# Patient Record
Sex: FEMALE | Race: WHITE | Employment: FULL TIME | URBAN - METROPOLITAN AREA
[De-identification: names, ages, dates, MRNs, and addresses within clinical notes are randomized per-mention and may not be internally consistent; named-entity substitution may affect disease eponyms.]

---

## 2017-09-15 ENCOUNTER — HOSPITAL ENCOUNTER (OUTPATIENT)
Dept: NUCLEAR MEDICINE | Age: 42
Discharge: HOME | End: 2017-09-15
Attending: FAMILY MEDICINE
Payer: COMMERCIAL

## 2017-09-15 DIAGNOSIS — E11.65 TYPE 2 DIABETES MELLITUS WITH HYPERGLYCEMIA (HCC): ICD-10-CM

## 2017-09-15 DIAGNOSIS — Z79.4 INSULIN LONG-TERM USE (HCC): ICD-10-CM

## 2017-09-15 DIAGNOSIS — R10.13 DYSPEPSIA: ICD-10-CM

## 2017-09-15 PROCEDURE — 78264 GASTRIC EMPTYING IMG STUDY: CPT

## 2018-05-07 ENCOUNTER — HOSPITAL ENCOUNTER (EMERGENCY)
Age: 43
Discharge: HOME | End: 2018-05-07
Attending: STUDENT IN AN ORGANIZED HEALTH CARE EDUCATION/TRAINING PROGRAM
Payer: COMMERCIAL

## 2018-05-07 ENCOUNTER — APPOINTMENT (OUTPATIENT)
Dept: GENERAL RADIOLOGY | Age: 43
End: 2018-05-07
Attending: PHYSICIAN ASSISTANT
Payer: COMMERCIAL

## 2018-05-07 VITALS
BODY MASS INDEX: 40.17 KG/M2 | TEMPERATURE: 98.3 F | OXYGEN SATURATION: 98 % | DIASTOLIC BLOOD PRESSURE: 81 MMHG | RESPIRATION RATE: 18 BRPM | HEART RATE: 96 BPM | WEIGHT: 219.6 LBS | SYSTOLIC BLOOD PRESSURE: 119 MMHG

## 2018-05-07 DIAGNOSIS — R10.13 DRUG-INDUCED DYSPEPSIA: Primary | ICD-10-CM

## 2018-05-07 DIAGNOSIS — T50.905A DRUG-INDUCED DYSPEPSIA: Primary | ICD-10-CM

## 2018-05-07 LAB
ALBUMIN SERPL-MCNC: 3.7 G/DL (ref 3.5–5)
ALBUMIN/GLOB SERPL: 0.9 {RATIO} (ref 1.1–2.2)
ALP SERPL-CCNC: 82 U/L (ref 45–117)
ALT SERPL-CCNC: 30 U/L (ref 12–78)
ANION GAP SERPL CALC-SCNC: 8 MMOL/L (ref 5–15)
APPEARANCE UR: CLEAR
AST SERPL-CCNC: 22 U/L (ref 15–37)
BACTERIA URNS QL MICRO: NEGATIVE /HPF
BASOPHILS # BLD: 0 K/UL (ref 0–0.1)
BASOPHILS NFR BLD: 0 % (ref 0–1)
BILIRUB SERPL-MCNC: 0.4 MG/DL (ref 0.2–1)
BILIRUB UR QL: NEGATIVE
BUN SERPL-MCNC: 16 MG/DL (ref 6–20)
BUN/CREAT SERPL: 23 (ref 12–20)
CALCIUM SERPL-MCNC: 9.6 MG/DL (ref 8.5–10.1)
CHLORIDE SERPL-SCNC: 107 MMOL/L (ref 97–108)
CO2 SERPL-SCNC: 26 MMOL/L (ref 21–32)
COLOR UR: NORMAL
CREAT SERPL-MCNC: 0.71 MG/DL (ref 0.55–1.02)
DIFFERENTIAL METHOD BLD: ABNORMAL
EOSINOPHIL # BLD: 0.1 K/UL (ref 0–0.4)
EOSINOPHIL NFR BLD: 1 % (ref 0–7)
EPITH CASTS URNS QL MICRO: NORMAL /LPF
ERYTHROCYTE [DISTWIDTH] IN BLOOD BY AUTOMATED COUNT: 13.6 % (ref 11.5–14.5)
GLOBULIN SER CALC-MCNC: 4.2 G/DL (ref 2–4)
GLUCOSE SERPL-MCNC: 98 MG/DL (ref 65–100)
GLUCOSE UR STRIP.AUTO-MCNC: NEGATIVE MG/DL
HCT VFR BLD AUTO: 45.1 % (ref 35–47)
HGB BLD-MCNC: 14.4 G/DL (ref 11.5–16)
HGB UR QL STRIP: NEGATIVE
HYALINE CASTS URNS QL MICRO: NORMAL /LPF (ref 0–5)
IMM GRANULOCYTES # BLD: 0 K/UL
IMM GRANULOCYTES NFR BLD AUTO: 0 %
KETONES UR QL STRIP.AUTO: NEGATIVE MG/DL
LEUKOCYTE ESTERASE UR QL STRIP.AUTO: NEGATIVE
LIPASE SERPL-CCNC: 90 U/L (ref 73–393)
LYMPHOCYTES # BLD: 4.7 K/UL (ref 0.8–3.5)
LYMPHOCYTES NFR BLD: 49 % (ref 12–49)
MCH RBC QN AUTO: 28.3 PG (ref 26–34)
MCHC RBC AUTO-ENTMCNC: 31.9 G/DL (ref 30–36.5)
MCV RBC AUTO: 88.8 FL (ref 80–99)
MONOCYTES # BLD: 0.3 K/UL (ref 0–1)
MONOCYTES NFR BLD: 3 % (ref 5–13)
NEUTS SEG # BLD: 4.5 K/UL (ref 1.8–8)
NEUTS SEG NFR BLD: 47 % (ref 32–75)
NITRITE UR QL STRIP.AUTO: NEGATIVE
NRBC # BLD: 0 K/UL (ref 0–0.01)
NRBC BLD-RTO: 0 PER 100 WBC
PH UR STRIP: 6 [PH] (ref 5–8)
PLATELET # BLD AUTO: 199 K/UL (ref 150–400)
PMV BLD AUTO: 11 FL (ref 8.9–12.9)
POTASSIUM SERPL-SCNC: 4 MMOL/L (ref 3.5–5.1)
PROT SERPL-MCNC: 7.9 G/DL (ref 6.4–8.2)
PROT UR STRIP-MCNC: NEGATIVE MG/DL
RBC # BLD AUTO: 5.08 M/UL (ref 3.8–5.2)
RBC #/AREA URNS HPF: NORMAL /HPF (ref 0–5)
RBC MORPH BLD: ABNORMAL
SODIUM SERPL-SCNC: 141 MMOL/L (ref 136–145)
SP GR UR REFRACTOMETRY: 1.01 (ref 1–1.03)
TROPONIN I SERPL-MCNC: <0.04 NG/ML
UR CULT HOLD, URHOLD: NORMAL
UROBILINOGEN UR QL STRIP.AUTO: 0.2 EU/DL (ref 0.2–1)
WBC # BLD AUTO: 9.6 K/UL (ref 3.6–11)
WBC MORPH BLD: ABNORMAL
WBC URNS QL MICRO: NORMAL /HPF (ref 0–4)

## 2018-05-07 PROCEDURE — 85025 COMPLETE CBC W/AUTO DIFF WBC: CPT | Performed by: PHYSICIAN ASSISTANT

## 2018-05-07 PROCEDURE — 83690 ASSAY OF LIPASE: CPT | Performed by: PHYSICIAN ASSISTANT

## 2018-05-07 PROCEDURE — 74011250637 HC RX REV CODE- 250/637: Performed by: STUDENT IN AN ORGANIZED HEALTH CARE EDUCATION/TRAINING PROGRAM

## 2018-05-07 PROCEDURE — 74011000250 HC RX REV CODE- 250: Performed by: STUDENT IN AN ORGANIZED HEALTH CARE EDUCATION/TRAINING PROGRAM

## 2018-05-07 PROCEDURE — 81001 URINALYSIS AUTO W/SCOPE: CPT | Performed by: PHYSICIAN ASSISTANT

## 2018-05-07 PROCEDURE — 99284 EMERGENCY DEPT VISIT MOD MDM: CPT

## 2018-05-07 PROCEDURE — 80053 COMPREHEN METABOLIC PANEL: CPT | Performed by: PHYSICIAN ASSISTANT

## 2018-05-07 PROCEDURE — 84484 ASSAY OF TROPONIN QUANT: CPT | Performed by: PHYSICIAN ASSISTANT

## 2018-05-07 PROCEDURE — 71046 X-RAY EXAM CHEST 2 VIEWS: CPT

## 2018-05-07 PROCEDURE — 96374 THER/PROPH/DIAG INJ IV PUSH: CPT

## 2018-05-07 PROCEDURE — 96375 TX/PRO/DX INJ NEW DRUG ADDON: CPT

## 2018-05-07 PROCEDURE — 93005 ELECTROCARDIOGRAM TRACING: CPT

## 2018-05-07 PROCEDURE — 74011250636 HC RX REV CODE- 250/636: Performed by: STUDENT IN AN ORGANIZED HEALTH CARE EDUCATION/TRAINING PROGRAM

## 2018-05-07 PROCEDURE — 36415 COLL VENOUS BLD VENIPUNCTURE: CPT | Performed by: PHYSICIAN ASSISTANT

## 2018-05-07 RX ORDER — ONDANSETRON 2 MG/ML
4 INJECTION INTRAMUSCULAR; INTRAVENOUS
Status: COMPLETED | OUTPATIENT
Start: 2018-05-07 | End: 2018-05-07

## 2018-05-07 RX ORDER — FAMOTIDINE 10 MG/ML
20 INJECTION INTRAVENOUS
Status: COMPLETED | OUTPATIENT
Start: 2018-05-07 | End: 2018-05-07

## 2018-05-07 RX ADMIN — LIDOCAINE HYDROCHLORIDE 40 ML: 20 SOLUTION ORAL; TOPICAL at 18:44

## 2018-05-07 RX ADMIN — ONDANSETRON 4 MG: 2 INJECTION INTRAMUSCULAR; INTRAVENOUS at 18:54

## 2018-05-07 RX ADMIN — FAMOTIDINE 20 MG: 10 INJECTION INTRAVENOUS at 18:49

## 2018-05-07 NOTE — ED TRIAGE NOTES
TRIAGE NOTE:  Patient arrives with c/o patient reports spasming sensation to center of chest that spasm up my throat. Patient c/o acid reflux, heart fluttering and tingling in fingers on Saturday, and tightness in chest that radiates to the back.

## 2018-05-07 NOTE — ED NOTES
Patient given discharge instructions, all questions answered and patient verbalized understanding.   Patient ambulatory to ED Lobby

## 2018-05-07 NOTE — DISCHARGE INSTRUCTIONS
Indigestion (Dyspepsia or Heartburn): Care Instructions  Your Care Instructions  Sometimes it can be hard to pinpoint the cause of indigestion. (It is also called dyspepsia or heartburn.) Most cases of an upset stomach with bloating, burning, burping, and nausea are minor and go away within several hours. Home treatment and over-the-counter medicine often are able to control symptoms. But if you take medicine to relieve your indigestion without making diet and lifestyle changes, your symptoms are likely to return again and again. If you get indigestion often, it may be a sign of a more serious medical problem. Be sure to follow up with your doctor, who may want to do tests to be sure of the cause of your indigestion. Follow-up care is a key part of your treatment and safety. Be sure to make and go to all appointments, and call your doctor if you are having problems. It's also a good idea to know your test results and keep a list of the medicines you take. How can you care for yourself at home? · Your doctor may recommend over-the-counter medicine. For mild or occasional indigestion, antacids such as Gaviscon, Mylanta, Maalox, or Tums, may help. Be safe with medicines. Be careful when you take over-the-counter antacid medicines. Many of these medicines have aspirin in them. Read the label to make sure that you are not taking more than the recommended dose. Too much aspirin can be harmful. · Your doctor also may recommend over-the-counter acid reducers, such as Pepcid AC, Tagamet HB, Zantac 75, or Prilosec. Read and follow all instructions on the label. If you use these medicines often, talk with your doctor. · Change your eating habits. ¨ It's best to eat several small meals instead of two or three large meals. ¨ After you eat, wait 2 to 3 hours before you lie down. ¨ Chocolate, mint, and alcohol can make GERD worse.   ¨ Spicy foods, foods that have a lot of acid (like tomatoes and oranges), and coffee can make GERD symptoms worse in some people. If your symptoms are worse after you eat a certain food, you may want to stop eating that food to see if your symptoms get better. · Do not smoke or chew tobacco. Smoking can make GERD worse. If you need help quitting, talk to your doctor about stop-smoking programs and medicines. These can increase your chances of quitting for good. · If you have GERD symptoms at night, raise the head of your bed 6 to 8 inches. You can do this by putting the frame on blocks or placing a foam wedge under the head of your mattress. (Adding extra pillows does not work.)  · Do not wear tight clothing around your middle. · Lose weight if you need to. Losing just 5 to 10 pounds can help. · Do not take anti-inflammatory medicines, such as aspirin, ibuprofen (Advil, Motrin), or naproxen (Aleve). These can irritate the stomach. If you need a pain medicine, try acetaminophen (Tylenol), which does not cause stomach upset. When should you call for help? Call your doctor now or seek immediate medical care if:  ? · You have new or worse belly pain. ? · You are vomiting. ? Watch closely for changes in your health, and be sure to contact your doctor if:  ? · You have new or worse symptoms of indigestion. ? · You have trouble or pain swallowing. ? · You are losing weight. ? · You do not get better as expected. Where can you learn more? Go to http://brandyn-marisela.info/. Enter E002 in the search box to learn more about \"Indigestion (Dyspepsia or Heartburn): Care Instructions. \"  Current as of: May 12, 2017  Content Version: 11.4  © 0869-7422 [employer]. Care instructions adapted under license by [employer] (which disclaims liability or warranty for this information).  If you have questions about a medical condition or this instruction, always ask your healthcare professional. [address] any warranty or liability for your use of this information. Side Effects of Medicine: Care Instructions  Your Care Instructions  Medicines are a big part of treatment for many health problems. But all medicines have side effects. Often these are mild problems. They might include a dry mouth or upset stomach. But sometimes medicines can cause dangerous side effects. One example is a bad allergic reaction. The best treatment will depend on what side effects you have. If you have a serious side effect, you may need to stop taking the medicine. You may also need to take another medicine to treat the side effect. If you have a mild side effect, it may go away after you take the medicine for a while. The doctor has checked you carefully, but problems can develop later. If you notice any problems or new symptoms, get medical treatment right away. Follow-up care is a key part of your treatment and safety. Be sure to make and go to all appointments, and call your doctor if you are having problems. It's also a good idea to know your test results and keep a list of the medicines you take. How can you care for yourself at home? · Be safe with medicines. Take your medicines exactly as prescribed. Call your doctor if you think you are having a problem with your medicine. · Call your doctor if side effects bother you and you wonder if you should keep taking a medicine. Your doctor may be able to lower your dose or change your medicine. Do not suddenly quit taking your medicine unless a doctor tells you to. · Make sure your doctor has a list of all the medicines, vitamins, supplements, and herbal remedies you take. Ask about side effects. When should you call for help? Call 911 anytime you think you may need emergency care. For example, call if:  · You have symptoms of a severe allergic reaction. These may include:  ¨ Sudden raised, red areas (hives) all over your body. ¨ Swelling of the throat, mouth, lips, or tongue. ¨ Trouble breathing.   ¨ Passing out (losing consciousness). Or you may feel very lightheaded or suddenly feel weak, confused, or restless. Call your doctor now or seek immediate medical care if:  · You have symptoms of an allergic reaction, such as:  ¨ A rash or hives (raised, red areas on the skin). ¨ Itching. ¨ Swelling. ¨ Belly pain, nausea, or vomiting. Watch closely for changes in your health, and be sure to contact your doctor if:  · You think you are having a new problem with your medicine. · You do not get better as expected. Where can you learn more? Go to [employer]  Enter D152 in the search box to learn more about \"Side Effects of Medicine: Care Instructions. \"   © 2877-2862 [employer]. Care instructions adapted under license by [employer] (which disclaims liability or warranty for this information). This care instruction is for use with your licensed healthcare professional. If you have questions about a medical condition or this instruction, always ask your healthcare professional. [place] [address] any warranty or liability for your use of this information.   Content Version: 97.8.363388; Current as of: November 20, 2015

## 2018-05-07 NOTE — ED PROVIDER NOTES
HPI Comments: 40 y.o. female with past medical history significant for diabetes, RA, GERD, and migraines who presents from home with chief complaint of chest pain. Pt states she recently started Trulicity three days ago and she had sudden onset two days ago of heart palpitations with accompanying SOB and bilateral finger numbness. Pt notes she developed \"3/10\" center CP last night, described as \"spasms\" and severe indigestion. Pt notes this pain radiates into her upper esophagus and she reports accompanying nausea, vomiting, decreased appetite, and chest tightness radiating into her middle back. Pt notes she had two episodes of vomiting today. Pt has a history of GERD but states she usually experiences epigastric pain with her acid reflux. Pt states she has been taking Tums with no relief. Pt has had a total hysterectomy and no other surgeries. Pt denies having SOB currently. Pt denies having abdominal pain, fever, or hematemesis. There are no other acute medical concerns at this time. PCP: STEPHEN Montano    Note written by Alvin Kerry S[place] Merlin, as dictated by Blue Rob MD 6:34 PM    The history is provided by the patient. Past Medical History:   Diagnosis Date    Diabetes ([address]     Migraine     RA ([facility]heumatoid [address]) [address] [address]        Past Surgical History:   Procedure Laterality Date    HX GYN      Hysterectomy 2010         No family history on file. Social History     Social History    Marital status:      Spouse name: N/A    Number of children: N/A    Years of education: N/A     Occupational History    Not on file. Social History Main Topics    Smoking status: Never Smoker    Smokeless tobacco: Not on file    Alcohol use Yes      Comment: rarely    Drug use: Not on file    Sexual activity: Not on file     Other Topics Concern    Not on file     Social History Narrative         ALLERGIES: Latex; Amoxicillin; Aspirin; Clindamycin;  Iodine; Pcn [penicillins]; Penicillin v potassium; Shellfish derived; and Sulfa (sulfonamide antibiotics)    Review of Systems   Constitutional: Positive for appetite change (decreased). Negative for chills and fever. HENT: Negative for sore throat. Respiratory: Positive for chest tightness. Negative for cough and shortness of breath. Cardiovascular: Positive for chest pain and palpitations. Gastrointestinal: Positive for nausea and vomiting. Negative for abdominal pain. Negative for hematemesis. Genitourinary: Negative for dysuria. Musculoskeletal: Negative for back pain. Skin: Negative for rash. Neurological: Positive for numbness (bilateral fingers). Negative for syncope and headaches. Psychiatric/Behavioral: Negative for confusion. All other systems reviewed and are negative. Vitals:    05/07/18 1735   BP: 139/82   Pulse: (!) 106   Resp: 18   Temp: 98.2 °F (36.8 °C)   SpO2: 99%   Weight: 99.6 kg (219 lb 9.6 oz)            Physical Exam   Constitutional: She is oriented to person, place, and time. No distress. Obese. HENT:   Head: Normocephalic and atraumatic. Eyes: Conjunctivae and EOM are normal. Pupils are equal, round, and reactive to light. Neck: Normal range of motion. Neck supple. Cardiovascular: Normal rate, regular rhythm and normal heart sounds. No murmur heard. Pulmonary/Chest: Effort normal and breath sounds normal. No respiratory distress. Abdominal: Soft. Bowel sounds are normal. She exhibits no distension. There is no tenderness. There is no rebound. Musculoskeletal: Normal range of motion. She exhibits no edema. Neurological: She is alert and oriented to person, place, and time. No cranial nerve deficit. She exhibits normal muscle tone. Coordination normal.   Skin: Skin is warm and dry. No rash noted. Psychiatric: She has a normal mood and affect. Her behavior is normal.   Nursing note and vitals reviewed. Note written by Lorie Grover, as dictated by Jakub Church MD 6:34 PM       [facility]      ED Course       Procedures    ED EKG interpretation:  Rhythm: normal sinus rhythm; Rate (approx.): 100 bpm; Axis: normal; Normal Intervals. No STEMI. Note written by Kristen Davis, as dictated by Jakub Church MD 5:42 PM      7:43 PM  Pt feeling better. Given timing of symptoms, most likely side effect of Trulicity. No evidence of ACS, AMI, DKA, hypoglycemia, UTI, severe electrolyte derangement, or sepsis that would cause her presentation today. [facility] stabilized.

## 2018-05-07 NOTE — LETTER
[address] 
[address] [place], [place] [address] [id number] 
[phone] Work/School Note Date: 5/7/2018 To Whom It May concern: Benedetto Meigs was seen and treated today in the emergency room by the following provider(s): 
Attending Provider: Luis Enrique Traore MD. Benedetto Meigs may return to work on Thursday May 10, 2018. Sincerely, Nick Bah RN

## 2018-05-08 LAB
ATRIAL RATE: 100 BPM
CALCULATED P AXIS, ECG09: 32 DEGREES
CALCULATED R AXIS, ECG10: 27 DEGREES
CALCULATED T AXIS, ECG11: 40 DEGREES
DIAGNOSIS, 93000: NORMAL
P-R INTERVAL, ECG05: 130 MS
Q-T INTERVAL, ECG07: 322 MS
QRS DURATION, ECG06: 76 MS
QTC CALCULATION (BEZET), ECG08: 415 MS
VENTRICULAR RATE, ECG03: 100 BPM

## 2018-06-11 ENCOUNTER — HOSPITAL ENCOUNTER (OUTPATIENT)
Dept: MAMMOGRAPHY | Age: 43
Discharge: HOME | End: 2018-06-11
Payer: COMMERCIAL

## 2018-06-11 DIAGNOSIS — Z12.31 VISIT FOR SCREENING MAMMOGRAM: ICD-10-CM

## 2018-06-11 PROCEDURE — 77067 SCR MAMMO BI INCL CAD: CPT

## 2021-01-01 ENCOUNTER — APPOINTMENT (OUTPATIENT)
Dept: CT IMAGING | Age: 46
DRG: 177 | End: 2021-01-01
Attending: INTERNAL MEDICINE
Payer: COMMERCIAL

## 2021-01-01 ENCOUNTER — HOSPITAL ENCOUNTER (INPATIENT)
Age: 46
LOS: 6 days | Discharge: LEFT BEFORE DISCHARGE | DRG: 177 | End: 2021-01-07
Attending: EMERGENCY MEDICINE | Admitting: INTERNAL MEDICINE
Payer: COMMERCIAL

## 2021-01-01 ENCOUNTER — APPOINTMENT (OUTPATIENT)
Dept: GENERAL RADIOLOGY | Age: 46
DRG: 177 | End: 2021-01-01
Attending: EMERGENCY MEDICINE
Payer: COMMERCIAL

## 2021-01-01 DIAGNOSIS — J12.82 PNEUMONIA DUE TO COVID-19 VIRUS: Primary | ICD-10-CM

## 2021-01-01 DIAGNOSIS — E10.65 UNCONTROLLED TYPE 1 DIABETES MELLITUS WITH HYPERGLYCEMIA (HCC): ICD-10-CM

## 2021-01-01 DIAGNOSIS — J96.01 ACUTE RESPIRATORY FAILURE WITH HYPOXIA (HCC): ICD-10-CM

## 2021-01-01 DIAGNOSIS — U07.1 PNEUMONIA DUE TO COVID-19 VIRUS: Primary | ICD-10-CM

## 2021-01-01 LAB
ALBUMIN SERPL-MCNC: 2.7 G/DL (ref 3.5–5)
ALBUMIN/GLOB SERPL: 0.5 {RATIO} (ref 1.1–2.2)
ALP SERPL-CCNC: 82 U/L (ref 45–117)
ALT SERPL-CCNC: 23 U/L (ref 12–78)
ANION GAP SERPL CALC-SCNC: 10 MMOL/L (ref 5–15)
ARTERIAL PATENCY WRIST A: YES
AST SERPL-CCNC: 38 U/L (ref 15–37)
B PERT DNA SPEC QL NAA+PROBE: NOT DETECTED
BASE DEFICIT BLD-SCNC: 1 MMOL/L
BASOPHILS # BLD: 0 K/UL (ref 0–0.1)
BASOPHILS NFR BLD: 0 % (ref 0–1)
BDY SITE: ABNORMAL
BILIRUB SERPL-MCNC: 0.6 MG/DL (ref 0.2–1)
BORDETELLA PARAPERTUSSIS PCR, BORPAR: NOT DETECTED
BUN SERPL-MCNC: 18 MG/DL (ref 6–20)
BUN/CREAT SERPL: 21 (ref 12–20)
C PNEUM DNA SPEC QL NAA+PROBE: NOT DETECTED
CA-I BLD-SCNC: 1.15 MMOL/L (ref 1.12–1.32)
CALCIUM SERPL-MCNC: 8.6 MG/DL (ref 8.5–10.1)
CHLORIDE SERPL-SCNC: 101 MMOL/L (ref 97–108)
CO2 SERPL-SCNC: 25 MMOL/L (ref 21–32)
COMMENT, HOLDF: NORMAL
COVID-19 RAPID TEST, COVR: DETECTED
CREAT SERPL-MCNC: 0.87 MG/DL (ref 0.55–1.02)
CRP SERPL-MCNC: 17.7 MG/DL (ref 0–0.6)
D DIMER PPP FEU-MCNC: 1.58 MG/L FEU (ref 0–0.65)
DIFFERENTIAL METHOD BLD: ABNORMAL
EOSINOPHIL # BLD: 0 K/UL (ref 0–0.4)
EOSINOPHIL NFR BLD: 0 % (ref 0–7)
ERYTHROCYTE [DISTWIDTH] IN BLOOD BY AUTOMATED COUNT: 13.3 % (ref 11.5–14.5)
FERRITIN SERPL-MCNC: 391 NG/ML (ref 8–252)
FIBRINOGEN PPP-MCNC: >800 MG/DL (ref 200–475)
FLUAV H1 2009 PAND RNA SPEC QL NAA+PROBE: NOT DETECTED
FLUAV H1 RNA SPEC QL NAA+PROBE: NOT DETECTED
FLUAV H3 RNA SPEC QL NAA+PROBE: NOT DETECTED
FLUAV SUBTYP SPEC NAA+PROBE: NOT DETECTED
FLUBV RNA SPEC QL NAA+PROBE: NOT DETECTED
GAS FLOW.O2 O2 DELIVERY SYS: ABNORMAL L/MIN
GAS FLOW.O2 SETTING OXYMISER: 15 L/M
GLOBULIN SER CALC-MCNC: 5.3 G/DL (ref 2–4)
GLUCOSE BLD STRIP.AUTO-MCNC: 226 MG/DL (ref 65–100)
GLUCOSE SERPL-MCNC: 167 MG/DL (ref 65–100)
HADV DNA SPEC QL NAA+PROBE: NOT DETECTED
HCO3 BLD-SCNC: 24.1 MMOL/L (ref 22–26)
HCOV 229E RNA SPEC QL NAA+PROBE: NOT DETECTED
HCOV HKU1 RNA SPEC QL NAA+PROBE: NOT DETECTED
HCOV NL63 RNA SPEC QL NAA+PROBE: NOT DETECTED
HCOV OC43 RNA SPEC QL NAA+PROBE: NOT DETECTED
HCT VFR BLD AUTO: 44.3 % (ref 35–47)
HEALTH STATUS, XMCV2T: ABNORMAL
HGB BLD-MCNC: 14.2 G/DL (ref 11.5–16)
HMPV RNA SPEC QL NAA+PROBE: NOT DETECTED
HPIV1 RNA SPEC QL NAA+PROBE: NOT DETECTED
HPIV2 RNA SPEC QL NAA+PROBE: NOT DETECTED
HPIV3 RNA SPEC QL NAA+PROBE: NOT DETECTED
HPIV4 RNA SPEC QL NAA+PROBE: NOT DETECTED
IMM GRANULOCYTES # BLD AUTO: 0 K/UL (ref 0–0.04)
IMM GRANULOCYTES NFR BLD AUTO: 0 % (ref 0–0.5)
INR PPP: 1 (ref 0.9–1.1)
LDH SERPL L TO P-CCNC: 481 U/L (ref 81–246)
LYMPHOCYTES # BLD: 1.4 K/UL (ref 0.8–3.5)
LYMPHOCYTES NFR BLD: 19 % (ref 12–49)
M PNEUMO DNA SPEC QL NAA+PROBE: NOT DETECTED
MCH RBC QN AUTO: 28.3 PG (ref 26–34)
MCHC RBC AUTO-ENTMCNC: 32.1 G/DL (ref 30–36.5)
MCV RBC AUTO: 88.2 FL (ref 80–99)
MONOCYTES # BLD: 0.4 K/UL (ref 0–1)
MONOCYTES NFR BLD: 5 % (ref 5–13)
MYELOCYTES NFR BLD MANUAL: 4 %
NEUTS BAND NFR BLD MANUAL: 1 %
NEUTS SEG # BLD: 5.4 K/UL (ref 1.8–8)
NEUTS SEG NFR BLD: 71 % (ref 32–75)
NRBC # BLD: 0.02 K/UL (ref 0–0.01)
NRBC BLD-RTO: 0.3 PER 100 WBC
PCO2 BLD: 39.4 MMHG (ref 35–45)
PH BLD: 7.39 [PH] (ref 7.35–7.45)
PLATELET # BLD AUTO: 286 K/UL (ref 150–400)
PMV BLD AUTO: 9.8 FL (ref 8.9–12.9)
PO2 BLD: 65 MMHG (ref 80–100)
POTASSIUM SERPL-SCNC: 3.2 MMOL/L (ref 3.5–5.1)
PROCALCITONIN SERPL-MCNC: 0.19 NG/ML
PROT SERPL-MCNC: 8 G/DL (ref 6.4–8.2)
PROTHROMBIN TIME: 10.9 SEC (ref 9–11.1)
RBC # BLD AUTO: 5.02 M/UL (ref 3.8–5.2)
RBC MORPH BLD: ABNORMAL
RSV RNA SPEC QL NAA+PROBE: NOT DETECTED
RV+EV RNA SPEC QL NAA+PROBE: NOT DETECTED
SAMPLES BEING HELD,HOLD: NORMAL
SAO2 % BLD: 92 % (ref 92–97)
SERVICE CMNT-IMP: ABNORMAL
SODIUM SERPL-SCNC: 136 MMOL/L (ref 136–145)
SOURCE, COVRS: ABNORMAL
SPECIMEN SOURCE, FCOV2M: ABNORMAL
SPECIMEN TYPE, XMCV1T: ABNORMAL
SPECIMEN TYPE: ABNORMAL
TOTAL RESP. RATE, ITRR: 25
TROPONIN I SERPL-MCNC: <0.05 NG/ML
WBC # BLD AUTO: 7.5 K/UL (ref 3.6–11)
WBC MORPH BLD: ABNORMAL

## 2021-01-01 PROCEDURE — 71045 X-RAY EXAM CHEST 1 VIEW: CPT

## 2021-01-01 PROCEDURE — 82803 BLOOD GASES ANY COMBINATION: CPT

## 2021-01-01 PROCEDURE — 74011000250 HC RX REV CODE- 250: Performed by: INTERNAL MEDICINE

## 2021-01-01 PROCEDURE — 82962 GLUCOSE BLOOD TEST: CPT

## 2021-01-01 PROCEDURE — 74011000636 HC RX REV CODE- 636: Performed by: INTERNAL MEDICINE

## 2021-01-01 PROCEDURE — 83615 LACTATE (LD) (LDH) ENZYME: CPT

## 2021-01-01 PROCEDURE — 85610 PROTHROMBIN TIME: CPT

## 2021-01-01 PROCEDURE — 80053 COMPREHEN METABOLIC PANEL: CPT

## 2021-01-01 PROCEDURE — 36415 COLL VENOUS BLD VENIPUNCTURE: CPT

## 2021-01-01 PROCEDURE — 85384 FIBRINOGEN ACTIVITY: CPT

## 2021-01-01 PROCEDURE — 84145 PROCALCITONIN (PCT): CPT

## 2021-01-01 PROCEDURE — 87635 SARS-COV-2 COVID-19 AMP PRB: CPT

## 2021-01-01 PROCEDURE — 0100U RESPIRATORY PANEL,PCR,NASOPHARYNGEAL: CPT

## 2021-01-01 PROCEDURE — 85379 FIBRIN DEGRADATION QUANT: CPT

## 2021-01-01 PROCEDURE — 74011250636 HC RX REV CODE- 250/636: Performed by: EMERGENCY MEDICINE

## 2021-01-01 PROCEDURE — 36600 WITHDRAWAL OF ARTERIAL BLOOD: CPT

## 2021-01-01 PROCEDURE — 84484 ASSAY OF TROPONIN QUANT: CPT

## 2021-01-01 PROCEDURE — 99285 EMERGENCY DEPT VISIT HI MDM: CPT

## 2021-01-01 PROCEDURE — 85025 COMPLETE CBC W/AUTO DIFF WBC: CPT

## 2021-01-01 PROCEDURE — 86140 C-REACTIVE PROTEIN: CPT

## 2021-01-01 PROCEDURE — 74011000258 HC RX REV CODE- 258: Performed by: INTERNAL MEDICINE

## 2021-01-01 PROCEDURE — 65660000000 HC RM CCU STEPDOWN

## 2021-01-01 PROCEDURE — 87040 BLOOD CULTURE FOR BACTERIA: CPT

## 2021-01-01 PROCEDURE — 71275 CT ANGIOGRAPHY CHEST: CPT

## 2021-01-01 PROCEDURE — 74011250636 HC RX REV CODE- 250/636: Performed by: INTERNAL MEDICINE

## 2021-01-01 PROCEDURE — 74011250637 HC RX REV CODE- 250/637: Performed by: INTERNAL MEDICINE

## 2021-01-01 PROCEDURE — 82728 ASSAY OF FERRITIN: CPT

## 2021-01-01 RX ORDER — ACETAMINOPHEN 650 MG/1
650 SUPPOSITORY RECTAL
Status: DISCONTINUED | OUTPATIENT
Start: 2021-01-01 | End: 2021-01-07 | Stop reason: HOSPADM

## 2021-01-01 RX ORDER — INSULIN LISPRO 100 [IU]/ML
INJECTION, SOLUTION INTRAVENOUS; SUBCUTANEOUS
Status: DISCONTINUED | OUTPATIENT
Start: 2021-01-01 | End: 2021-01-05

## 2021-01-01 RX ORDER — POLYETHYLENE GLYCOL 3350 17 G/17G
17 POWDER, FOR SOLUTION ORAL DAILY PRN
Status: DISCONTINUED | OUTPATIENT
Start: 2021-01-01 | End: 2021-01-07 | Stop reason: HOSPADM

## 2021-01-01 RX ORDER — MAGNESIUM SULFATE 100 %
4 CRYSTALS MISCELLANEOUS AS NEEDED
Status: DISCONTINUED | OUTPATIENT
Start: 2021-01-01 | End: 2021-01-04 | Stop reason: SDUPTHER

## 2021-01-01 RX ORDER — SODIUM CHLORIDE 9 MG/ML
75 INJECTION, SOLUTION INTRAVENOUS CONTINUOUS
Status: DISCONTINUED | OUTPATIENT
Start: 2021-01-01 | End: 2021-01-02

## 2021-01-01 RX ORDER — DEXTROSE 50 % IN WATER (D50W) INTRAVENOUS SYRINGE
12.5-25 AS NEEDED
Status: DISCONTINUED | OUTPATIENT
Start: 2021-01-01 | End: 2021-01-04 | Stop reason: SDUPTHER

## 2021-01-01 RX ORDER — POTASSIUM CHLORIDE 750 MG/1
40 TABLET, FILM COATED, EXTENDED RELEASE ORAL ONCE
Status: DISCONTINUED | OUTPATIENT
Start: 2021-01-01 | End: 2021-01-01

## 2021-01-01 RX ORDER — ENOXAPARIN SODIUM 100 MG/ML
40 INJECTION SUBCUTANEOUS EVERY 12 HOURS
Status: DISCONTINUED | OUTPATIENT
Start: 2021-01-01 | End: 2021-01-07 | Stop reason: HOSPADM

## 2021-01-01 RX ORDER — DIPHENHYDRAMINE HYDROCHLORIDE 50 MG/ML
12.5 INJECTION, SOLUTION INTRAMUSCULAR; INTRAVENOUS ONCE
Status: DISCONTINUED | OUTPATIENT
Start: 2021-01-01 | End: 2021-01-01

## 2021-01-01 RX ORDER — HYDROCODONE POLISTIREX AND CHLORPHENIRAMINE POLISTIREX 10; 8 MG/5ML; MG/5ML
5 SUSPENSION, EXTENDED RELEASE ORAL
Status: DISCONTINUED | OUTPATIENT
Start: 2021-01-01 | End: 2021-01-07 | Stop reason: HOSPADM

## 2021-01-01 RX ORDER — GUAIFENESIN/DEXTROMETHORPHAN 100-10MG/5
5 SYRUP ORAL 2 TIMES DAILY
Status: DISCONTINUED | OUTPATIENT
Start: 2021-01-01 | End: 2021-01-07 | Stop reason: HOSPADM

## 2021-01-01 RX ORDER — ALBUTEROL SULFATE 90 UG/1
2 AEROSOL, METERED RESPIRATORY (INHALATION)
Status: DISCONTINUED | OUTPATIENT
Start: 2021-01-01 | End: 2021-01-07 | Stop reason: HOSPADM

## 2021-01-01 RX ORDER — SODIUM CHLORIDE 0.9 % (FLUSH) 0.9 %
5-40 SYRINGE (ML) INJECTION EVERY 8 HOURS
Status: DISCONTINUED | OUTPATIENT
Start: 2021-01-01 | End: 2021-01-07 | Stop reason: HOSPADM

## 2021-01-01 RX ORDER — LEVOFLOXACIN 5 MG/ML
750 INJECTION, SOLUTION INTRAVENOUS EVERY 24 HOURS
Status: COMPLETED | OUTPATIENT
Start: 2021-01-01 | End: 2021-01-05

## 2021-01-01 RX ORDER — ACETAMINOPHEN 325 MG/1
650 TABLET ORAL
Status: DISCONTINUED | OUTPATIENT
Start: 2021-01-01 | End: 2021-01-07 | Stop reason: HOSPADM

## 2021-01-01 RX ORDER — DEXAMETHASONE SODIUM PHOSPHATE 4 MG/ML
10 INJECTION, SOLUTION INTRA-ARTICULAR; INTRALESIONAL; INTRAMUSCULAR; INTRAVENOUS; SOFT TISSUE
Status: COMPLETED | OUTPATIENT
Start: 2021-01-01 | End: 2021-01-01

## 2021-01-01 RX ORDER — DIPHENHYDRAMINE HYDROCHLORIDE 50 MG/ML
25 INJECTION, SOLUTION INTRAMUSCULAR; INTRAVENOUS ONCE
Status: COMPLETED | OUTPATIENT
Start: 2021-01-01 | End: 2021-01-01

## 2021-01-01 RX ORDER — DEXAMETHASONE SODIUM PHOSPHATE 4 MG/ML
6 INJECTION, SOLUTION INTRA-ARTICULAR; INTRALESIONAL; INTRAMUSCULAR; INTRAVENOUS; SOFT TISSUE EVERY 24 HOURS
Status: DISCONTINUED | OUTPATIENT
Start: 2021-01-02 | End: 2021-01-07 | Stop reason: HOSPADM

## 2021-01-01 RX ORDER — ONDANSETRON 2 MG/ML
4 INJECTION INTRAMUSCULAR; INTRAVENOUS
Status: DISCONTINUED | OUTPATIENT
Start: 2021-01-01 | End: 2021-01-07 | Stop reason: HOSPADM

## 2021-01-01 RX ORDER — PROMETHAZINE HYDROCHLORIDE 25 MG/1
12.5 TABLET ORAL
Status: DISCONTINUED | OUTPATIENT
Start: 2021-01-01 | End: 2021-01-07 | Stop reason: HOSPADM

## 2021-01-01 RX ORDER — SODIUM CHLORIDE 0.9 % (FLUSH) 0.9 %
5-40 SYRINGE (ML) INJECTION AS NEEDED
Status: DISCONTINUED | OUTPATIENT
Start: 2021-01-01 | End: 2021-01-07 | Stop reason: HOSPADM

## 2021-01-01 RX ADMIN — METHYLPREDNISOLONE SODIUM SUCCINATE 40 MG: 40 INJECTION, POWDER, FOR SOLUTION INTRAMUSCULAR; INTRAVENOUS at 20:37

## 2021-01-01 RX ADMIN — ENOXAPARIN SODIUM 40 MG: 40 INJECTION SUBCUTANEOUS at 20:36

## 2021-01-01 RX ADMIN — GUAIFENESIN AND DEXTROMETHORPHAN 5 ML: 100; 10 SYRUP ORAL at 20:36

## 2021-01-01 RX ADMIN — DIPHENHYDRAMINE HYDROCHLORIDE 25 MG: 50 INJECTION, SOLUTION INTRAMUSCULAR; INTRAVENOUS at 22:17

## 2021-01-01 RX ADMIN — ALBUTEROL SULFATE 2 PUFF: 90 AEROSOL, METERED RESPIRATORY (INHALATION) at 20:38

## 2021-01-01 RX ADMIN — SODIUM CHLORIDE 75 ML/HR: 9 INJECTION, SOLUTION INTRAVENOUS at 18:15

## 2021-01-01 RX ADMIN — DEXAMETHASONE SODIUM PHOSPHATE 10 MG: 4 INJECTION, SOLUTION INTRA-ARTICULAR; INTRALESIONAL; INTRAMUSCULAR; INTRAVENOUS; SOFT TISSUE at 18:18

## 2021-01-01 RX ADMIN — LEVOFLOXACIN 750 MG: 5 INJECTION, SOLUTION INTRAVENOUS at 18:19

## 2021-01-01 RX ADMIN — POTASSIUM BICARBONATE 40 MEQ: 782 TABLET, EFFERVESCENT ORAL at 20:37

## 2021-01-01 RX ADMIN — REMDESIVIR 200 MG: 100 INJECTION, POWDER, LYOPHILIZED, FOR SOLUTION INTRAVENOUS at 20:36

## 2021-01-01 RX ADMIN — IOPAMIDOL 80 ML: 755 INJECTION, SOLUTION INTRAVENOUS at 21:03

## 2021-01-01 NOTE — PROGRESS NOTES
Lovenox adjusted to 40 mg sc every 12 hr for bmi > 30 and crcl > 30 ml/min per covid dosing protocol    GRISEL Newsome

## 2021-01-01 NOTE — ED PROVIDER NOTES
EMERGENCY DEPARTMENT HISTORY AND PHYSICAL EXAM      Date: 1/1/2021  Patient Name: Rebecca Miranda    History of Presenting Illness     Chief Complaint   Patient presents with    Shortness of Breath     Pt comes via EMS for c/o SOB pt is COVID positive       History Provided By: Patient    HPI: Brenda Lomas, 41 y.o. female with PMHx significant for diabetes, RA on Enbrel, gastroparesis, who presents with a chief complaint of worsening shortness of breath in the setting of known COVID-19 infection. Patient reports that she was diagnosed with Covid 3 days ago after test at her primary care office. Began having symptoms on 12/24 including body aches, fever as high as 103, shortness of breath, fatigue, cough, and decreased p.o. intake. Reports her last several days she has had increasing shortness of breath, has felt lightheaded, and has passed out a total of 4 times. She has been taking Tylenol, Delsym, zinc, D3, and vitamin C at home.  also has COVID-19. PCP: Promise Bhatti MD    There are no other complaints, changes, or physical findings at this time.     Current Facility-Administered Medications   Medication Dose Route Frequency Provider Last Rate Last Admin    sodium chloride (NS) flush 5-40 mL  5-40 mL IntraVENous Q8H Jay Reyes MD        sodium chloride (NS) flush 5-40 mL  5-40 mL IntraVENous PRN Phani Beal MD        acetaminophen (TYLENOL) tablet 650 mg  650 mg Oral Q6H PRN Phani Beal MD        Or    acetaminophen (TYLENOL) suppository 650 mg  650 mg Rectal Q6H PRN Phani Beal MD        polyethylene glycol (MIRALAX) packet 17 g  17 g Oral DAILY PRN Phani Beal MD        promethazine (PHENERGAN) tablet 12.5 mg  12.5 mg Oral Q6H PRN Phani Beal MD        Or    ondansetron [facility]) injection 4 mg  4 mg IntraVENous Q6H PRN Phani Beal MD        enoxaparin (LOVENOX) injection 40 mg  40 mg SubCUTAneous Q12H Phani Beal MD   40 mg at 01/01/21 2036    [START ON 1/2/2021] dexamethasone (DECADRON) 4 mg/mL injection 6 mg  6 mg IntraVENous Q24H Dorene Traylor MD        levoFLOXacin (LEVAQUIN) 750 mg in D5W IVPB  750 mg IntraVENous Q24H Dorene Traylor  mL/hr at 01/01/21 1819 750 mg at 01/01/21 1819    albuterol (PROVENTIL HFA, VENTOLIN HFA, PROAIR HFA) inhaler 2 Puff  2 Puff Inhalation Q6H RT Dorene Traylor MD   2 Puff at 01/01/21 2038    HYDROcodone-chlorpheniramine (TUSSIONEX) oral suspension 5 mL  5 mL Oral Q12H PRN Dorene Traylor MD        insulin lispro (HUMALOG) injection   SubCUTAneous AC&HS Dorene Traylor MD        glucose chewable tablet 16 g  4 Tab Oral PRN Dorene Traylor MD        dextrose (D50W) injection syrg 12.5-25 g  12.5-25 g IntraVENous PRN Dorene Traylor MD        glucagon (GLUCAGEN) injection 1 mg  1 mg IntraMUSCular PRN Dorene Traylor MD        0.9% sodium chloride infusion  75 mL/hr IntraVENous CONTINUOUS Dorene Traylor MD 75 mL/hr at 01/01/21 1815 75 mL/hr at 01/01/21 1815    [START ON 1/2/2021] remdesivir 100 mg in 0.9% sodium chloride 250 mL IVPB  100 mg IntraVENous Q24H Dorene Traylor MD        guaiFENesin-dextromethorphan (ROBITUSSIN DM) 100-10 mg/5 mL syrup 5 mL  5 mL Oral BID Dorene Traylor MD   5 mL at 01/01/21 2036    iopamidoL (ISOVUE-370) 76 % injection 100 mL  100 mL IntraVENous RAD ONCE Dorene Traylor MD         Past History     Past Medical History:  Past Medical History:   Diagnosis Date    Diabetes ([facility] [address]     Migraine     RA (rheumatoid arthritis) ([facility] [address]      Past Surgical History:  Past Surgical History:   Procedure Laterality Date    HX GYN      Hysterectomy 2010     Family History:  Family History   Problem Relation Age of Onset    Breast Cancer Mother 58    Breast Cancer Maternal Aunt 50     Social History:  Social History     Tobacco Use    Smoking status: Never Smoker   Substance Use Topics    Alcohol use: Yes     Comment: rarely    Drug use: Not on file Allergies: Allergies   Allergen Reactions    Latex Hives    Amoxicillin Unknown (comments)    Aspirin Nausea Only    Clindamycin Rash    Iodine Hives    Pcn [Penicillins] Rash    Penicillin V Potassium Other (comments)    Shellfish Derived Hives    Sulfa (Sulfonamide Antibiotics) Unknown (comments)     Review of Systems   Review of Systems   Constitutional: Positive for appetite change, fatigue and fever. Negative for chills. HENT: Negative for congestion, rhinorrhea and sore throat. Respiratory: Positive for cough and shortness of breath. Cardiovascular: Negative for chest pain. Gastrointestinal: Negative for abdominal pain, nausea and vomiting. Genitourinary: Negative for dysuria and urgency. Skin: Negative for rash. Neurological: Positive for syncope and light-headedness. Negative for dizziness and headaches. All other systems reviewed and are negative. Physical Exam   Physical Exam  Vitals signs and nursing note reviewed. Constitutional:       General: She is not in acute distress. Appearance: She is well-developed. She is obese. HENT:      Head: Normocephalic and atraumatic. Eyes:      Conjunctiva/sclera: Conjunctivae normal.      Pupils: Pupils are equal, round, and reactive to light. Neck:      Musculoskeletal: Normal range of motion. Cardiovascular:      Rate and Rhythm: Normal rate and regular rhythm. Pulmonary:      Effort: Pulmonary effort is normal. Tachypnea present. No respiratory distress. Breath sounds: Normal breath sounds. No stridor. Abdominal:      General: There is no distension. Palpations: Abdomen is soft. Tenderness: There is no abdominal tenderness. Musculoskeletal: Normal range of motion. Skin:     General: Skin is warm and dry. Neurological:      Mental Status: She is alert and oriented to person, place, and time.        Diagnostic Study Results   Labs -     Recent Results (from the past 12 hour(s))   SAMPLES BEING HELD    Collection Time: 01/01/21  2:53 PM   Result Value Ref Range    SAMPLES BEING HELD RD BL     COMMENT        Add-on orders for these samples will be processed based on acceptable specimen integrity and analyte stability, which may vary by analyte. CBC WITH AUTOMATED DIFF    Collection Time: 01/01/21  2:53 PM   Result Value Ref Range    WBC 7.5 3.6 - 11.0 K/uL    RBC 5.02 3.80 - 5.20 M/uL    HGB 14.2 11.5 - 16.0 g/dL    HCT 44.3 35.0 - 47.0 %    MCV 88.2 80.0 - 99.0 FL    MCH 28.3 26.0 - 34.0 PG    MCHC 32.1 30.0 - 36.5 g/dL    RDW 13.3 11.5 - 14.5 %    PLATELET 134 463 - 092 K/uL    MPV 9.8 8.9 - 12.9 FL    NRBC 0.3 (H) 0  WBC    ABSOLUTE NRBC 0.02 (H) 0.00 - 0.01 K/uL    NEUTROPHILS 71 32 - 75 %    BAND NEUTROPHILS 1 %    LYMPHOCYTES 19 12 - 49 %    MONOCYTES 5 5 - 13 %    EOSINOPHILS 0 0 - 7 %    BASOPHILS 0 0 - 1 %    MYELOCYTES 4 %    IMMATURE GRANULOCYTES 0 0.0 - 0.5 %    ABS. NEUTROPHILS 5.4 1.8 - 8.0 K/UL    ABS. LYMPHOCYTES 1.4 0.8 - 3.5 K/UL    ABS. MONOCYTES 0.4 0.0 - 1.0 K/UL    ABS. EOSINOPHILS 0.0 0.0 - 0.4 K/UL    ABS. BASOPHILS 0.0 0.0 - 0.1 K/UL    ABS. IMM. GRANS. 0.0 0.00 - 0.04 K/UL    DF MANUAL      RBC COMMENTS NORMOCYTIC, NORMOCHROMIC      WBC COMMENTS ATYPICAL LYMPHOCYTES PRESENT     METABOLIC PANEL, COMPREHENSIVE    Collection Time: 01/01/21  2:53 PM   Result Value Ref Range    Sodium 136 136 - 145 mmol/L    Potassium 3.2 (L) 3.5 - 5.1 mmol/L    Chloride 101 97 - 108 mmol/L    CO2 25 21 - 32 mmol/L    Anion gap 10 5 - 15 mmol/L    Glucose 167 (H) 65 - 100 mg/dL    BUN 18 6 - 20 MG/DL    Creatinine 0.87 0.55 - 1.02 MG/DL    BUN/Creatinine ratio 21 (H) 12 - 20      GFR est AA >60 >60 ml/min/1.73m2    GFR est non-AA >60 >60 ml/min/1.73m2    Calcium 8.6 8.5 - 10.1 MG/DL    Bilirubin, total 0.6 0.2 - 1.0 MG/DL    ALT (SGPT) 23 12 - 78 U/L    AST (SGOT) 38 (H) 15 - 37 U/L    Alk.  phosphatase 82 45 - 117 U/L    Protein, total 8.0 6.4 - 8.2 g/dL    Albumin 2.7 (L) 3.5 - 5.0 g/dL Globulin 5.3 (H) 2.0 - 4.0 g/dL    A-G Ratio 0.5 (L) 1.1 - 2.2     TROPONIN I    Collection Time: 01/01/21  2:53 PM   Result Value Ref Range    Troponin-I, Qt. <0.05 <0.05 ng/mL   PROCALCITONIN    Collection Time: 01/01/21  2:53 PM   Result Value Ref Range    Procalcitonin 0.19 ng/mL   PROTHROMBIN TIME + INR    Collection Time: 01/01/21  2:53 PM   Result Value Ref Range    INR 1.0 0.9 - 1.1      Prothrombin time 10.9 9.0 - 11.1 sec   LD    Collection Time: 01/01/21  2:53 PM   Result Value Ref Range     (H) 81 - 246 U/L   FERRITIN    Collection Time: 01/01/21  2:53 PM   Result Value Ref Range    Ferritin 391 (H) 8 - 252 NG/ML   D DIMER    Collection Time: 01/01/21  2:53 PM   Result Value Ref Range    D-dimer 1.58 (H) 0.00 - 0.65 mg/L FEU   FIBRINOGEN    Collection Time: 01/01/21  2:53 PM   Result Value Ref Range    Fibrinogen >800 (H) 200 - 475 mg/dL   C REACTIVE PROTEIN, QT    Collection Time: 01/01/21  2:53 PM   Result Value Ref Range    C-Reactive protein 17.70 (H) 0.00 - 0.60 mg/dL   POC EG7    Collection Time: 01/01/21  4:28 PM   Result Value Ref Range    Calcium, ionized (POC) 1.15 1.12 - 1.32 mmol/L    pH (POC) 7.39 7.35 - 7.45      pCO2 (POC) 39.4 35.0 - 45.0 MMHG    pO2 (POC) 65 (L) 80 - 100 MMHG    HCO3 (POC) 24.1 22 - 26 MMOL/L    Base deficit (POC) 1 mmol/L    sO2 (POC) 92 92 - 97 %    Site LEFT RADIAL      Device: Non rebreather      Flow rate (POC) 15 L/M    Allens test (POC) YES      Specimen type (POC) ARTERIAL      Total resp. rate 25     SARS-COV-2    Collection Time: 01/01/21  5:19 PM   Result Value Ref Range    Specimen source Nasopharyngeal      Specimen source Nasopharyngeal      COVID-19 rapid test Detected (AA) NOTD      Specimen type NP Swab      Health status Symptomatic Testing         Radiologic Studies -   XR CHEST PORT   Final Result   IMPRESSION: Diffuse bilateral patchy airspace disease is compatible with COVID   pneumonia.      CTA CHEST W OR W WO CONT    (Results Pending)  Xr Chest Port    Result Date: 1/1/2021  IMPRESSION: Diffuse bilateral patchy airspace disease is compatible with COVID pneumonia. Medical Decision Making   I am the first provider for this patient. I reviewed the vital signs, available nursing notes, past medical history, past surgical history, family history and social history. Vital Signs-Reviewed the patient's vital signs. Patient Vitals for the past 12 hrs:   Temp Pulse Resp BP SpO2   01/01/21 2130 98.4 °F (36.9 °C) 95 27 113/60 96 %   01/01/21 2038  89  (!) 151/92    01/01/21 1930 98.4 °F (36.9 °C) 96 22 118/62 90 %   01/01/21 1847     90 %   01/01/21 1428 98.4 °F (36.9 °C) (!) 107 22 133/63 (!) 78 %       Pulse Oximetry Analysis - 96% on ra    Cardiac Monitor:   Rate: 89 bpm  Rhythm: Normal Sinus Rhythm      Records Reviewed: Nursing Notes and Old Medical Records    Provider Notes (Medical Decision Making):   Patient presents with worsening shortness of breath, cough, lightheadedness, and syncopal episodes in the setting of known COVID-19 infection. On arrival she is significantly hypoxic on room air to 78%, improved on nonrebreather. On my evaluation she is dyspneic but able to talk in sentences. Suspect syncopal episodes likely related to severe hypoxia. Will check basic lab work, send inflammatory markers, check chest x-ray. We will also check ABG given profound hypoxia. ED Course:   Initial assessment performed. The patients presenting problems have been discussed, and they are in agreement with the care plan formulated and outlined with them. I have encouraged them to ask questions as they arise throughout their visit. ABG with a PO2 of 65%. Patient does appear improved on nonrebreather, suspect she has had prolonged hypoxia at home. Inflammatory markers elevated. Chest x-ray with bilateral patchy airspace disease consistent with COVID-19. Dose of azithromycin given.   Discussed with hospitalist, Dr. Kitty Avila, who will see the patient for admission. Procedures:  Procedures    Critical Care:  CRITICAL CARE NOTE :  IMPENDING DETERIORATION -Airway and Respiratory  ASSOCIATED RISK FACTORS - Hypotension and Hypoxia  MANAGEMENT- Bedside Assessment  INTERPRETATION -  Xrays, Blood Gases and ECG  INTERVENTIONS - NRB O2  CASE REVIEW - Hospitalist  TREATMENT RESPONSE -Improved  PERFORMED BY - Self    NOTES   :    I have spent 35 minutes of critical care time involved in lab review, consultations with specialist, family decision- making, bedside attention and documentation. During this entire length of time I was immediately available to the patient . Enedina Velez MD      Disposition:    Admission Note:  Patient is being admitted to the hospital by Dr. Chikis Tran, Service: Hospitalist.  The results of their tests and reasons for their admission have been discussed with them and available family. They convey agreement and understanding for the need to be admitted and for their admission diagnosis. Diagnosis     Clinical Impression:   1. Pneumonia due to COVID-19 virus    2. Acute respiratory failure with hypoxia ([address]            Please note that this dictation was completed with [employer], the computer voice recognition software. Quite often unanticipated grammatical, syntax, homophones, and other interpretive errors are inadvertently transcribed by the computer software. Please disregard these errors.   Please excuse any errors that have escaped final proofreading

## 2021-01-01 NOTE — H&P
Hospitalist Admission Note    NAME: Eun Johnson   :  1975   MRN:  [id number]     Date/Time:  2021 5:18 PM    Patient PCP: Blue Justin MD  ______________________________________________________________________  Given the patient's current clinical presentation, I have a high level of concern for decompensation if discharged from the emergency department. Complex decision making was performed, which includes reviewing the patient's available past medical records, laboratory results, and x-ray films. My assessment of this patient's clinical condition and my plan of care is as follows. Assessment / Plan:  Acute hypoxic respiratory failure POA  COVID-19 pneumonia  COVID-19  +, at PCP office  Syncope POA  History of RA on Enbrel    -syncope x4 at home, sats 78% on RA in ED, Currently on NRB in ED.  -D-dimer 1.58, Fibrinogen >800,  Trop neg, Procal 0.19,   -admit to stepdown  -continue o2, switch to HFNC, keep sats >93%  -Get CTA chest to rule out PE, due to multiple syncopal episodes. Premedicating before CT  -check Viral panel PCR for any co-infection  -Dexamethasone 6 mg daily  -levaquine daily  -Discussed with the patient, consented for remdesivir  -Gentle IVF  -Consult pulmonary  -Follow blood culture  -As needed Tussionex  -Dextromethorphan/guaifenesin twice daily  -Ventolin scheduled    History of diabetes mellitus, has insulin pump -continue insulin pump  Sliding scale insulin  -May need to change to long-acting insulin here      H/o RA- takes Enbrel weekly, last dose Dec 19  -Was taking methotrexate, last dose 4 months ago    History of hypertension, continue calcium channel blocker        Code Status: Full code, discussed with the patient  Surrogate Decision Maker: Spouse    DVT Prophylaxis: Lovenox  GI Prophylaxis: not indicated    Baseline: Ambulatory    I have provided   36     minutes of critical care time.   During this entire length of time I was immediately available to the patient. The reason for providing this level of medical care was due to a critical illness that impaired one or more vital organ systems, such that there was a high probability of imminent or life threatening deterioration in the patient's condition. This care involved high complexity decision making which includes reviewing the patient's past medical records, current laboratory results, and actual Xray films in order to assess, support vital system function, and to treat this degree of vital organ system failure, and to prevent further life threatening deterioration of the patients condition. Subjective:   CHIEF COMPLAINT: SOB    HISTORY OF PRESENT ILLNESS:     Christal Sampson is a 41 y.o.  female with a past medical history of hypertension, rheumatoid arthritis, diabetes mellitus, gastroparesis presented to ED with a chief complaint of shortness of breath and syncopal episodes. She reports she started having upper respiratory symptoms since December 24, so she get checked for COVID-19 on December 29, and it was positive. She endorses cough mainly dry, decreased p.o. intake, body ache, fever up to 103. Has been taking Tylenol and symptomatic treatment. Shortness of breath has been getting worse, she has Apple Watch shows saturation between 62 to 92% for past 3 days. She also had a full syncopal episode 1 yesterday and 3 today, it was brief as per her but she lost consciousness. The ED she was saturating 78% on room air, tachypneic. She was placed on NRB. We were asked to admit for work up and evaluation of the above problems. Past Medical History:   Diagnosis Date    Diabetes ([address]     Migraine     RA [facility]umatoid [facility]) [address] [address]         Past Surgical History:   Procedure Laterality Date    HX GYN      Hysterectomy 2010       Social History     Tobacco Use    Smoking status: Never Smoker   Substance Use Topics    Alcohol use:  Yes Comment: rarely        Family History   Problem Relation Age of Onset    Breast Cancer Mother 58    Breast Cancer Maternal Aunt 48     Allergies   Allergen Reactions    Latex Hives    Amoxicillin Unknown (comments)    Aspirin Nausea Only    Clindamycin Rash    Iodine Hives    Pcn [Penicillins] Rash    Penicillin V Potassium Other (comments)    Shellfish Derived Hives    Sulfa (Sulfonamide Antibiotics) Unknown (comments)        Prior to Admission medications    Medication Sig Start Date End Date Taking? Authorizing Provider   DULoxetine (CYMBALTA) 60 mg capsule Take 60 mg by mouth daily. Yes Provider, Historical   etanercept (ENBREL) 50 mg/mL (0.98 mL) injection 50 mg by SubCUTAneous route. Yes Provider, Historical   fexofenadine (ALLEGRA) 60 mg tablet Take 60 mg by mouth two (2) times a day. Yes Provider, Historical   diclofenac (VOLTAREN) 1 % gel Apply 2 g to affected area four (4) times daily. Yes Provider, Historical   sulindac (CLINORIL) 200 mg tablet Take 400 mg by mouth two (2) times a day. Yes Provider, Historical   simvastatin (ZOCOR) 20 mg tablet Take 20 mg by mouth nightly. Yes Provider, Historical   cyclobenzaprine (FLEXERIL) 10 mg tablet Take 10 mg by mouth nightly. Yes Provider, Historical   gabapentin (NEURONTIN) 300 mg capsule Take 300 mg by mouth nightly. Yes Provider, Historical   leucovorin calcium (WELLCOVORIN) 5 mg tablet Take 10 mg by mouth daily as needed (Take for 2 days after Methotrexate). Provider, Historical   methotrexate, PF, 25 mg/mL injection every seven (7) days. Takes 8 units/ weekly    Provider, Historical   fluconazole (DIFLUCAN) 150 mg tablet Take 150 mg by mouth daily. Provider, Historical   insulin aspart (NOVOLOG) 100 unit/mL injection by SubCUTAneous route every twenty-four (24) hours. As per pump protocol, roughly 75units daily    Provider, Historical   pantoprazole (PROTONIX) 40 mg tablet Take 40 mg by mouth daily.     Provider, Historical diltiazem hcl 180 mg Tb24 Take 1 Cap by mouth daily. Provider, Historical   oxyCODONE-acetaminophen (PERCOCET) 5-325 mg per tablet Take 1 Tab by mouth every four (4) hours as needed for Pain. Max Daily Amount: 6 Tabs. 6/1/16   Si Sandifer, MD       REVIEW OF SYSTEMS:     I am not able to complete the review of systems because:    The patient is intubated and sedated    The patient has altered mental status due to his acute medical problems    The patient has baseline aphasia from prior stroke(s)    The patient has baseline dementia and is not reliable historian    The patient is in acute medical distress and unable to provide information           Total of 12 systems reviewed as follows:       POSITIVE= underlined text  Negative = text not underlined  General:  fever, chills, sweats, generalized weakness, weight loss/gain,      loss of appetite   Eyes:    blurred vision, eye pain, loss of vision, double vision  ENT:    rhinorrhea, pharyngitis   Respiratory:   cough, sputum production, SOB, POWERS, wheezing, pleuritic pain   Cardiology:   chest pain, palpitations, orthopnea, PND, edema, syncope   Gastrointestinal:  abdominal pain , N/V, diarrhea, dysphagia, constipation, bleeding   Genitourinary:  frequency, urgency, dysuria, hematuria, incontinence   Muskuloskeletal :  arthralgia, myalgia, back pain  Hematology:  easy bruising, nose or gum bleeding, lymphadenopathy   Dermatological: rash, ulceration, pruritis, color change / jaundice  Endocrine:   hot flashes or polydipsia   Neurological:  headache, dizziness, confusion, focal weakness, paresthesia,     Speech difficulties, memory loss, gait difficulty  Psychological: Feelings of anxiety, depression, agitation    Objective:   VITALS:    Visit Vitals  /63 (BP 1 Location: Right arm)   Pulse (!) 107   Temp 98.4 °F (36.9 °C)   Resp 22   Ht 5' 1\" (1.549 m)   Wt 98.4 kg (217 lb)   SpO2 (!) 78%   BMI 41.00 kg/m²       PHYSICAL EXAM:    General:    Alert, cooperative, Mild distress    HEENT: Atraumatic, anicteric sclerae, pink conjunctivae  Neck:  Supple, symmetrical,  thyroid: non tender  Lungs:   Coarse sounds bilaterally, expiratory wheezing  Chest wall:  No tenderness  No Accessory muscle use. Heart:   Regular  rhythm,  No  murmur   No edema  Abdomen:   Soft, non-tender. Not distended. Bowel sounds normal  Extremities: No cyanosis. No clubbing,    Skin:     Not pale. Not Jaundiced  No rashes   Psych:  Good insight. Not depressed. Not anxious or agitated. Neurologic: EOMs intact. No facial asymmetry. No aphasia or slurred speech. Symmetrical strength, Sensation grossly intact. Alert and oriented X 4.     _______________________________________________________________________  Care Plan discussed with:    Comments   Patient x    Family      RN x    Care Manager                    Consultant:      _______________________________________________________________________  Expected  Disposition:   Home with Family x   HH/PT/OT/RN    SNF/LTC    SHAN    ________________________________________________________________________  TOTAL TIME:  27  Minutes    Critical Care Provided   39  Minutes non procedure based      Comments     Reviewed previous records   >50% of visit spent in counseling and coordination of care  Discussion with patient and/or family and questions answered       ________________________________________________________________________  Signed: Anurag nEglish MD    Procedures: see electronic medical records for all procedures/Xrays and details which were not copied into this note but were reviewed prior to creation of Plan.     LAB DATA REVIEWED:    Recent Results (from the past 24 hour(s))   SAMPLES BEING HELD    Collection Time: 01/01/21  2:53 PM   Result Value Ref Range    SAMPLES BEING HELD RD BL     COMMENT        Add-on orders for these samples will be processed based on acceptable specimen integrity and analyte stability, which may vary by analyte. CBC WITH AUTOMATED DIFF    Collection Time: 01/01/21  2:53 PM   Result Value Ref Range    WBC 7.5 3.6 - 11.0 K/uL    RBC 5.02 3.80 - 5.20 M/uL    HGB 14.2 11.5 - 16.0 g/dL    HCT 44.3 35.0 - 47.0 %    MCV 88.2 80.0 - 99.0 FL    MCH 28.3 26.0 - 34.0 PG    MCHC 32.1 30.0 - 36.5 g/dL    RDW 13.3 11.5 - 14.5 %    PLATELET 416 625 - 129 K/uL    MPV 9.8 8.9 - 12.9 FL    NRBC 0.3 (H) 0  WBC    ABSOLUTE NRBC 0.02 (H) 0.00 - 0.01 K/uL    NEUTROPHILS 71 32 - 75 %    BAND NEUTROPHILS 1 %    LYMPHOCYTES 19 12 - 49 %    MONOCYTES 5 5 - 13 %    EOSINOPHILS 0 0 - 7 %    BASOPHILS 0 0 - 1 %    MYELOCYTES 4 %    IMMATURE GRANULOCYTES 0 0.0 - 0.5 %    ABS. NEUTROPHILS 5.4 1.8 - 8.0 K/UL    ABS. LYMPHOCYTES 1.4 0.8 - 3.5 K/UL    ABS. MONOCYTES 0.4 0.0 - 1.0 K/UL    ABS. EOSINOPHILS 0.0 0.0 - 0.4 K/UL    ABS. BASOPHILS 0.0 0.0 - 0.1 K/UL    ABS. IMM. GRANS. 0.0 0.00 - 0.04 K/UL    DF MANUAL      RBC COMMENTS NORMOCYTIC, NORMOCHROMIC      WBC COMMENTS ATYPICAL LYMPHOCYTES PRESENT     METABOLIC PANEL, COMPREHENSIVE    Collection Time: 01/01/21  2:53 PM   Result Value Ref Range    Sodium 136 136 - 145 mmol/L    Potassium 3.2 (L) 3.5 - 5.1 mmol/L    Chloride 101 97 - 108 mmol/L    CO2 25 21 - 32 mmol/L    Anion gap 10 5 - 15 mmol/L    Glucose 167 (H) 65 - 100 mg/dL    BUN 18 6 - 20 MG/DL    Creatinine 0.87 0.55 - 1.02 MG/DL    BUN/Creatinine ratio 21 (H) 12 - 20      GFR est AA >60 >60 ml/min/1.73m2    GFR est non-AA >60 >60 ml/min/1.73m2    Calcium 8.6 8.5 - 10.1 MG/DL    Bilirubin, total 0.6 0.2 - 1.0 MG/DL    ALT (SGPT) 23 12 - 78 U/L    AST (SGOT) 38 (H) 15 - 37 U/L    Alk.  phosphatase 82 45 - 117 U/L    Protein, total 8.0 6.4 - 8.2 g/dL    Albumin 2.7 (L) 3.5 - 5.0 g/dL    Globulin 5.3 (H) 2.0 - 4.0 g/dL    A-G Ratio 0.5 (L) 1.1 - 2.2     TROPONIN I    Collection Time: 01/01/21  2:53 PM   Result Value Ref Range    Troponin-I, Qt. <0.05 <0.05 ng/mL   PROCALCITONIN    Collection Time: 01/01/21  2:53 PM   Result Value Ref Range    Procalcitonin 0.19 ng/mL   PROTHROMBIN TIME + INR    Collection Time: 01/01/21  2:53 PM   Result Value Ref Range    INR 1.0 0.9 - 1.1      Prothrombin time 10.9 9.0 - 11.1 sec   LD    Collection Time: 01/01/21  2:53 PM   Result Value Ref Range     (H) 81 - 246 U/L   D DIMER    Collection Time: 01/01/21  2:53 PM   Result Value Ref Range    D-dimer 1.58 (H) 0.00 - 0.65 mg/L FEU   FIBRINOGEN    Collection Time: 01/01/21  2:53 PM   Result Value Ref Range    Fibrinogen >800 (H) 200 - 475 mg/dL   POC EG7    Collection Time: 01/01/21  4:28 PM   Result Value Ref Range    Calcium, ionized (POC) 1.15 1.12 - 1.32 mmol/L    pH (POC) 7.39 7.35 - 7.45      pCO2 (POC) 39.4 35.0 - 45.0 MMHG    pO2 (POC) 65 (L) 80 - 100 MMHG    HCO3 (POC) 24.1 22 - 26 MMOL/L    Base deficit (POC) 1 mmol/L    sO2 (POC) 92 92 - 97 %    Site LEFT RADIAL      Device: Non rebreather      Flow rate (POC) 15 L/M    Allens test (POC) YES      Specimen type (POC) ARTERIAL      Total resp.  rate 25

## 2021-01-02 LAB
ANION GAP SERPL CALC-SCNC: 11 MMOL/L (ref 5–15)
BASOPHILS # BLD: 0 K/UL (ref 0–0.1)
BASOPHILS NFR BLD: 0 % (ref 0–1)
BUN SERPL-MCNC: 17 MG/DL (ref 6–20)
BUN/CREAT SERPL: 24 (ref 12–20)
CALCIUM SERPL-MCNC: 8.4 MG/DL (ref 8.5–10.1)
CHLORIDE SERPL-SCNC: 102 MMOL/L (ref 97–108)
CO2 SERPL-SCNC: 23 MMOL/L (ref 21–32)
CREAT SERPL-MCNC: 0.72 MG/DL (ref 0.55–1.02)
D DIMER PPP FEU-MCNC: 2.07 MG/L FEU (ref 0–0.65)
DIFFERENTIAL METHOD BLD: ABNORMAL
EOSINOPHIL # BLD: 0 K/UL (ref 0–0.4)
EOSINOPHIL NFR BLD: 0 % (ref 0–7)
ERYTHROCYTE [DISTWIDTH] IN BLOOD BY AUTOMATED COUNT: 13.5 % (ref 11.5–14.5)
FERRITIN SERPL-MCNC: 371 NG/ML (ref 8–252)
FIBRINOGEN PPP-MCNC: >800 MG/DL (ref 200–475)
GLUCOSE BLD STRIP.AUTO-MCNC: 197 MG/DL (ref 65–100)
GLUCOSE BLD STRIP.AUTO-MCNC: 201 MG/DL (ref 65–100)
GLUCOSE BLD STRIP.AUTO-MCNC: 255 MG/DL (ref 65–100)
GLUCOSE BLD STRIP.AUTO-MCNC: 269 MG/DL (ref 65–100)
GLUCOSE SERPL-MCNC: 208 MG/DL (ref 65–100)
HCT VFR BLD AUTO: 40.9 % (ref 35–47)
HGB BLD-MCNC: 13 G/DL (ref 11.5–16)
IMM GRANULOCYTES # BLD AUTO: 0 K/UL (ref 0–0.04)
IMM GRANULOCYTES NFR BLD AUTO: 0 % (ref 0–0.5)
LYMPHOCYTES # BLD: 0.6 K/UL (ref 0.8–3.5)
LYMPHOCYTES NFR BLD: 9 % (ref 12–49)
MAGNESIUM SERPL-MCNC: 2.4 MG/DL (ref 1.6–2.4)
MCH RBC QN AUTO: 28.6 PG (ref 26–34)
MCHC RBC AUTO-ENTMCNC: 31.8 G/DL (ref 30–36.5)
MCV RBC AUTO: 89.9 FL (ref 80–99)
METAMYELOCYTES NFR BLD MANUAL: 1 %
MONOCYTES # BLD: 0.1 K/UL (ref 0–1)
MONOCYTES NFR BLD: 1 % (ref 5–13)
MYELOCYTES NFR BLD MANUAL: 1 %
NEUTS BAND NFR BLD MANUAL: 2 %
NEUTS SEG # BLD: 5.9 K/UL (ref 1.8–8)
NEUTS SEG NFR BLD: 86 % (ref 32–75)
NRBC # BLD: 0.02 K/UL (ref 0–0.01)
NRBC BLD-RTO: 0.3 PER 100 WBC
PLATELET # BLD AUTO: 263 K/UL (ref 150–400)
PMV BLD AUTO: 9.7 FL (ref 8.9–12.9)
POTASSIUM SERPL-SCNC: 4.2 MMOL/L (ref 3.5–5.1)
PROCALCITONIN SERPL-MCNC: 0.17 NG/ML
RBC # BLD AUTO: 4.55 M/UL (ref 3.8–5.2)
RBC MORPH BLD: ABNORMAL
SERVICE CMNT-IMP: ABNORMAL
SODIUM SERPL-SCNC: 136 MMOL/L (ref 136–145)
WBC # BLD AUTO: 6.7 K/UL (ref 3.6–11)

## 2021-01-02 PROCEDURE — 74011000258 HC RX REV CODE- 258: Performed by: INTERNAL MEDICINE

## 2021-01-02 PROCEDURE — 85384 FIBRINOGEN ACTIVITY: CPT

## 2021-01-02 PROCEDURE — 80048 BASIC METABOLIC PNL TOTAL CA: CPT

## 2021-01-02 PROCEDURE — 74011250637 HC RX REV CODE- 250/637: Performed by: INTERNAL MEDICINE

## 2021-01-02 PROCEDURE — 74011000250 HC RX REV CODE- 250: Performed by: INTERNAL MEDICINE

## 2021-01-02 PROCEDURE — 65660000000 HC RM CCU STEPDOWN

## 2021-01-02 PROCEDURE — 84145 PROCALCITONIN (PCT): CPT

## 2021-01-02 PROCEDURE — 74011250636 HC RX REV CODE- 250/636: Performed by: INTERNAL MEDICINE

## 2021-01-02 PROCEDURE — 36415 COLL VENOUS BLD VENIPUNCTURE: CPT

## 2021-01-02 PROCEDURE — 85379 FIBRIN DEGRADATION QUANT: CPT

## 2021-01-02 PROCEDURE — 82728 ASSAY OF FERRITIN: CPT

## 2021-01-02 PROCEDURE — 82962 GLUCOSE BLOOD TEST: CPT

## 2021-01-02 PROCEDURE — 94640 AIRWAY INHALATION TREATMENT: CPT

## 2021-01-02 PROCEDURE — 85025 COMPLETE CBC W/AUTO DIFF WBC: CPT

## 2021-01-02 PROCEDURE — 83735 ASSAY OF MAGNESIUM: CPT

## 2021-01-02 PROCEDURE — 77010033711 HC HIGH FLOW OXYGEN

## 2021-01-02 RX ORDER — ATORVASTATIN CALCIUM 10 MG/1
10 TABLET, FILM COATED ORAL
Status: DISCONTINUED | OUTPATIENT
Start: 2021-01-03 | End: 2021-01-07 | Stop reason: HOSPADM

## 2021-01-02 RX ORDER — DILTIAZEM HYDROCHLORIDE 120 MG/1
120 CAPSULE, EXTENDED RELEASE ORAL DAILY
Status: DISCONTINUED | OUTPATIENT
Start: 2021-01-03 | End: 2021-01-02

## 2021-01-02 RX ORDER — PANTOPRAZOLE SODIUM 40 MG/1
40 TABLET, DELAYED RELEASE ORAL
Status: DISCONTINUED | OUTPATIENT
Start: 2021-01-03 | End: 2021-01-07 | Stop reason: HOSPADM

## 2021-01-02 RX ORDER — GABAPENTIN 300 MG/1
300 CAPSULE ORAL
Status: DISCONTINUED | OUTPATIENT
Start: 2021-01-03 | End: 2021-01-07 | Stop reason: HOSPADM

## 2021-01-02 RX ORDER — DEXTROSE 50 % IN WATER (D50W) INTRAVENOUS SYRINGE
12.5-25 AS NEEDED
Status: DISCONTINUED | OUTPATIENT
Start: 2021-01-02 | End: 2021-01-07 | Stop reason: HOSPADM

## 2021-01-02 RX ORDER — MAGNESIUM SULFATE 100 %
4 CRYSTALS MISCELLANEOUS AS NEEDED
Status: DISCONTINUED | OUTPATIENT
Start: 2021-01-02 | End: 2021-01-07 | Stop reason: HOSPADM

## 2021-01-02 RX ORDER — ZINC SULFATE 50(220)MG
1 CAPSULE ORAL DAILY
Status: DISCONTINUED | OUTPATIENT
Start: 2021-01-02 | End: 2021-01-07 | Stop reason: HOSPADM

## 2021-01-02 RX ORDER — OXYCODONE AND ACETAMINOPHEN 5; 325 MG/1; MG/1
1 TABLET ORAL
Status: DISCONTINUED | OUTPATIENT
Start: 2021-01-02 | End: 2021-01-07 | Stop reason: HOSPADM

## 2021-01-02 RX ORDER — ASCORBIC ACID 500 MG
1000 TABLET ORAL DAILY
Status: DISCONTINUED | OUTPATIENT
Start: 2021-01-02 | End: 2021-01-07 | Stop reason: HOSPADM

## 2021-01-02 RX ORDER — CYCLOBENZAPRINE HCL 10 MG
10 TABLET ORAL
Status: DISCONTINUED | OUTPATIENT
Start: 2021-01-03 | End: 2021-01-07 | Stop reason: HOSPADM

## 2021-01-02 RX ORDER — DILTIAZEM HYDROCHLORIDE 120 MG/1
120 CAPSULE, COATED, EXTENDED RELEASE ORAL DAILY
Status: DISCONTINUED | OUTPATIENT
Start: 2021-01-03 | End: 2021-01-07 | Stop reason: HOSPADM

## 2021-01-02 RX ADMIN — Medication 10 ML: at 16:18

## 2021-01-02 RX ADMIN — ALBUTEROL SULFATE 2 PUFF: 90 AEROSOL, METERED RESPIRATORY (INHALATION) at 02:38

## 2021-01-02 RX ADMIN — ZINC SULFATE 220 MG (50 MG) CAPSULE 1 CAPSULE: CAPSULE at 12:15

## 2021-01-02 RX ADMIN — GUAIFENESIN AND DEXTROMETHORPHAN 5 ML: 100; 10 SYRUP ORAL at 08:30

## 2021-01-02 RX ADMIN — REMDESIVIR 100 MG: 100 INJECTION, POWDER, LYOPHILIZED, FOR SOLUTION INTRAVENOUS at 20:58

## 2021-01-02 RX ADMIN — OXYCODONE HYDROCHLORIDE AND ACETAMINOPHEN 1000 MG: 500 TABLET ORAL at 12:15

## 2021-01-02 RX ADMIN — LEVOFLOXACIN 750 MG: 5 INJECTION, SOLUTION INTRAVENOUS at 17:03

## 2021-01-02 RX ADMIN — CYCLOBENZAPRINE 10 MG: 10 TABLET, FILM COATED ORAL at 23:53

## 2021-01-02 RX ADMIN — ATORVASTATIN CALCIUM 10 MG: 10 TABLET, FILM COATED ORAL at 23:53

## 2021-01-02 RX ADMIN — Medication 10 ML: at 20:58

## 2021-01-02 RX ADMIN — GUAIFENESIN AND DEXTROMETHORPHAN 5 ML: 100; 10 SYRUP ORAL at 17:03

## 2021-01-02 RX ADMIN — ALBUTEROL SULFATE 2 PUFF: 90 AEROSOL, METERED RESPIRATORY (INHALATION) at 14:23

## 2021-01-02 RX ADMIN — ENOXAPARIN SODIUM 40 MG: 40 INJECTION SUBCUTANEOUS at 20:58

## 2021-01-02 RX ADMIN — ALBUTEROL SULFATE 2 PUFF: 90 AEROSOL, METERED RESPIRATORY (INHALATION) at 09:18

## 2021-01-02 RX ADMIN — ALBUTEROL SULFATE 2 PUFF: 90 AEROSOL, METERED RESPIRATORY (INHALATION) at 19:14

## 2021-01-02 RX ADMIN — DEXAMETHASONE SODIUM PHOSPHATE 6 MG: 4 INJECTION, SOLUTION INTRAMUSCULAR; INTRAVENOUS at 08:30

## 2021-01-02 RX ADMIN — ENOXAPARIN SODIUM 40 MG: 40 INJECTION SUBCUTANEOUS at 08:31

## 2021-01-02 RX ADMIN — GABAPENTIN 300 MG: 300 CAPSULE ORAL at 23:53

## 2021-01-02 NOTE — PROGRESS NOTES
Hospitalist Progress Note    NAME: Manjeet Gloria   :  1975   MRN:  [id number]       Assessment / Plan:  Acute hypoxic respiratory failure POA  COVID-19 pneumonia  -covid swab positive at PCP office   -on high flow, titrated to maintain sats > 90%  -cont dexamethaxone  -cont remdesivir  -wean O2 if able to maintain sats  -appreciate pulm consult  -zinc and vitamin C  -empiric levaquin    CTA chest:  No acute pulmonary embolism. Marked patchy bilateral lung opacities in keeping with atypical/viral  infection. Hepatic steatosis. DM1  -has insulin pump  -infuses novolin, usually about 75 units daily  -given steroids Tx, will likely need additional insulin dosing  -SSI with POCs    RA  -on enbreal, last dose   -previously on methotrexate     HTN  -cont CCB    HLD  -cont statin           Code Status: Full code, discussed with the patient  Surrogate Decision Maker: Spouse     DVT Prophylaxis: Lovenox  GI Prophylaxis: not indicated     Baseline: Ambulatory     PPx[de-identified] lovenox     Subjective:     Chief Complaint / Reason for Physician Visit  No acute distress. SOB, able to carry on conversation    Discussed with RN events overnight. Review of Systems:  Symptom Y/N Comments  Symptom Y/N Comments   Fever/Chills n   Chest Pain n    Poor Appetite n   Edema     Cough y   Abdominal Pain n    Sputum n   Joint Pain     SOB/POWERS y   Pruritis/Rash     Nausea/vomit n   Tolerating PT/OT     Diarrhea n   Tolerating Diet y    Constipation n   Other       Could NOT obtain due to:      Objective:     VITALS:   Last 24hrs VS reviewed since prior progress note.  Most recent are:  Patient Vitals for the past 24 hrs:   Temp Pulse Resp BP SpO2   21 1215  94      21 1200  97      21 1100 98.4 °F (36.9 °C) 95 21 (!) 118/54 98 %   21 0918     96 %   21 0800 98.3 °F (36.8 °C) 83 17 131/63 95 %   21 0311 97.8 °F (36.6 °C) 81 25 (!) 112/46 91 %   21 0238     94 % 01/01/21 2224 98.1 °F (36.7 °C) 90 26 (!) 125/53 93 %   01/01/21 2130 98.4 °F (36.9 °C) 95 27 113/60 96 %   01/01/21 2038  89  (!) 151/92    01/01/21 1930 98.4 °F (36.9 °C) 96 22 118/62 90 %   01/01/21 1847     90 %   01/01/21 1428 98.4 °F (36.9 °C) (!) 107 22 133/63 (!) 78 %     No intake or output data in the 24 hours ending 01/02/21 1259     I had a face to face encounter and independently examined this patient on 1/2/2021, as outlined below:  PHYSICAL EXAM:  General: WD, WN. Alert, cooperative, no acute distress    EENT:  EOMI. Anicteric sclerae. MMM  Resp:  Few rhonchi, no rales/wheezing. No accessory muscle use  CV:  Regular  rhythm,  No edema  GI:  Soft, Non distended, Non tender. +Bowel sounds  Neurologic:  Alert and oriented X 3, normal speech,   Psych:   Good insight. Not anxious nor agitated  Skin:  No rashes. No jaundice    Reviewed most current lab test results and cultures  YES  Reviewed most current radiology test results   YES  Review and summation of old records today    NO  Reviewed patient's current orders and MAR    YES  PMH/SH reviewed - no change compared to H&P  ________________________________________________________________________  Care Plan discussed with:    Comments   Patient x    Family      RN x    Care Manager     Consultant                        Multidiciplinary team rounds were held today with , nursing, pharmacist and clinical coordinator. Patient's plan of care was discussed; medications were reviewed and discharge planning was addressed.      ________________________________________________________________________  Total NON critical care TIME:  30   Minutes    Total CRITICAL CARE TIME Spent:   Minutes non procedure based      Comments   >50% of visit spent in counseling and coordination of care     ________________________________________________________________________  Kari Bobo DO     Procedures: see electronic medical records for all procedures/Xrays and details which were not copied into this note but were reviewed prior to creation of Plan. LABS:  I reviewed today's most current labs and imaging studies.   Pertinent labs include:  Recent Labs     01/02/21 0313 01/01/21  1453   WBC 6.7 7.5   HGB 13.0 14.2   HCT 40.9 44.3    286     Recent Labs     01/02/21 0313 01/01/21  1453    136   K 4.2 3.2*    101   CO2 23 25   * 167*   BUN 17 18   CREA 0.72 0.87   CA 8.4* 8.6   MG 2.4  --    ALB  --  2.7*   TBILI  --  0.6   ALT  --  23   INR  --  1.0       Signed: Tyson Junior, DO

## 2021-01-02 NOTE — ED NOTES
2144: Called PCU to give, but the Nurse is busy in an isolation room and couldn't take report    Pt is scheduled for CTA at 2230, pt needs IV benadryl 15 minutes prior to CT contrast.     2200: TRANSFER - OUT REPORT:    Verbal report given to MERRY(name) on Dennis Miranda  being transferred to [phone] PCU(unit) for routine progression of care       Report consisted of patients Situation, Background, Assessment and   Recommendations(SBAR). Information from the following report(s) SBAR, Kardex, ED Summary, [facility] and Recent Results was reviewed with the receiving nurse. Lines:   Peripheral IV 01/01/21 Left Antecubital (Active)   Site Assessment Clean, dry, & intact 01/01/21 1451   Phlebitis Assessment 0 01/01/21 1451   Infiltration Assessment 0 01/01/21 1451   Dressing Status Clean, dry, & intact 01/01/21 1451        Opportunity for questions and clarification was provided.       Patient transported with:   Monitor  O2 @ 10 liters  Registered Nurse

## 2021-01-02 NOTE — PROGRESS NOTES
2200 TRANSFER - IN REPORT:    Verbal report received from Frandy(name) on [place]  being received from ED(unit) for routine progression of care      Report consisted of patients Situation, Background, Assessment and   Recommendations(SBAR). Information from the following report(s) SBAR, Kardex, Intake/Output, Recent Results and Cardiac Rhythm NSR was reviewed with the receiving nurse. Opportunity for questions and clarification was provided. Assessment completed upon patients arrival to unit and care assumed. 2217: pt arrived to unit, vss, CT calling to get pt for CTA, pt premedicated with IV Benadryl and assessed as noted     2240: pt off floor for CT at this time     2330: pt arrived back from CT, vitals remained stable, POC glucose checked, pt adjusted insulin pump accordingly     End of Shift Note    Bedside shift change report given to GRIFFIN FLORES [facility] (oncoming nurse) by Romina Lua (offgoing nurse). Report included the following information SBAR, Kardex, Intake/Output, Recent Results and Cardiac Rhythm NSR    Shift worked:  7p-7a     Shift summary and any significant changes:     on 13 L high flow nasal cannula      Concerns for physician to address:  none     Zone phone for oncoming shift:          Activity:  Activity Level:  Up with Assistance  Number times ambulated in hallways past shift: 0  Number of times OOB to chair past shift: 2    Cardiac:   Cardiac Monitoring: Yes      Cardiac Rhythm: Normal sinus rhythm    Access:   Current line(s): PIV     Genitourinary:   Urinary status: voiding    Respiratory:   O2 Device: Hi flow nasal cannula  Chronic home O2 use?: NO  Incentive spirometer at bedside: NO     GI:     Current diet:  DIET DIABETIC CONSISTENT CARB Regular  Passing flatus: YES  Tolerating current diet: YES       Pain Management:   Patient states pain is manageable on current regimen: YES    Skin:  Mc Score: 21  Interventions: increase time out of bed    Patient Safety:  Fall Score:  Total Score: 4  Interventions: gripper socks  High Fall Risk: Yes    Length of Stay:  Expected LOS: - - -  Actual LOS: 1      Karen Engel

## 2021-01-02 NOTE — CONSULTS
[facility]    Name:  Eliezer Braun  MR#:  [id number]  :  1975  ACCOUNT #:  [de-identified]  DATE OF SERVICE:  2021    REQUESTING PHYSICIAN:  Hospitalist service. INDICATIONS:  COVID pneumonia. A physical taken from the doorway to save PPE and reduce COVID exposures. CHIEF COMPLAINT:  Shortness of breath. HISTORY OF PRESENT ILLNESS:  The patient is a 71-year-old lady with a history of diabetes, on insulin pump, rheumatoid arthritis on Enbrel, gastroparesis, who presents with shortness of breath. She presents to the hospital secondary to having symptoms the day before [place] with high fevers and shortness of breath. Her  has actually also had COVID at home. Her PCP is Dr. Yuliya Montelongo. Upon arrival to the emergency department, the patient's C-reactive protein was 17, troponins were negative, ferritin was 391, with a chest CT scan that was done last night which had bilateral patchy airspace disease consistent with COVID pneumonia. MEDICAL HISTORY:  As noted above. FAMILY HISTORY:  Breast cancer. SOCIAL HISTORY:  Nonsmoker. ALLERGIES:  TO LATEX, AMOXICILLIN, CLINDAMYCIN, ASPIRIN, IODINE, PENICILLIN, SHELLFISH, AND SULFA. REVIEW OF SYSTEMS:  Per chart. CONSTITUTIONAL:  Review was positive for fatigue and fever. HEENT:  Eyes:  No double or blurred vision. Ears, nose and throat normal.  RESPIRATORY:  No hoarseness. CARDIOVASCULAR:  No chest pain or palpitation. PULMONARY:  Positive for cough, shortness of breath. GI:  No nausea or vomiting. :  No dysuria or hematuria. ENDOCRINE:  No polyuria or polydipsia. HEME/LYMPH:  No bleeding or bruising. Rawleigh Billing PSYCHIATRIC:  No anxiety or depression. PHYSICAL EXAMINATION:  VITAL SIGNS:  Temperature 98.3, pulse of 83, respiratory rate of 17, blood pressure 131/63, saturating 95% on high-flow 12 L. GENERAL:  Well-developed, well-nourished lady. No respiratory distress.   NEUROLOGIC:  She appeared intact. LABORATORY DATA:  White count of 6.7, with a hemoglobin of 13.0, platelet count of 675. Fibrinogen greater than 800, D-dimer 2.07, creatinine of 0.72. IMPRESSION:  1. COVID pneumonia. 2.  Diabetes with history of insulin pump. 3.  Gastroparesis. DISCUSSION AND PLAN:  1. Agree with current therapy of remdesivir, steroids and nebs as needed. 2.  Oxygen to keep the saturations greater than 92%. 3.  Plasma is controversial per [facility] and the [facility] article in 11/2020.  4.  Maintain euvolemic status. 5.  Zinc and vitamin C.  6.  If any worsening, will need ICU transfer. 7.  We will perform chart check on Sunday.       MD JANET Cabello/[email or web address]  D:  01/02/2021 10:44  T:  01/02/2021 17:34  JOB #:  [id number]

## 2021-01-02 NOTE — ED NOTES
Bedside and Verbal shift change report given to Frandy (oncoming nurse) by Nonda Goodell (offgoing nurse). Report included the following information SBAR, ED Summary and MAR.

## 2021-01-02 NOTE — PROGRESS NOTES
Bedside and Verbal shift change report given to [address] (oncoming nurse) by Kiara Olivares (offgoing nurse). Report included the following information SBAR, Kardex, Intake/Output, MAR, Recent Results and Med Rec Status. 0800: Patient awake in bed, no complaints of pain, oxygen placed on 12L heated hiflow bubble bottle. Insulin pump with patient, patient reported pump gave 1.6 units this morning for coverage. Will speak with MD about plan for insulin coverage. 1215: MD ordered to use patients own insulin pump. Will record how much insulin patient receives for each blood sugar check. End of Shift Note    Bedside shift change report given to Kiara Olivares (oncoming nurse) by Yakelin Keys RN (offgoing nurse). Report included the following information SBAR, Kardex, Intake/Output, MAR, Recent Results and Med Rec Status    Shift worked:  [id number]     Shift summary and any significant changes:     Patient on hiflow bubble bottle, 8L. Using own insulin pump, orders placed. Concerns for physician to address:  N/A     Zone phone for oncoming shift:          Activity:  Activity Level: Up with Assistance  Number times ambulated in hallways past shift: 0  Number of times OOB to chair past shift: 2    Cardiac:   Cardiac Monitoring: Yes      Cardiac Rhythm: Sinus tachycardia    Access:   Current line(s): PIV     Genitourinary:   Urinary status: voiding    Respiratory:   O2 Device: Hi flow nasal cannula  Chronic home O2 use?: NO  Incentive spirometer at bedside: NO     GI:     Current diet:  DIET DIABETIC CONSISTENT CARB Regular  Passing flatus: YES  Tolerating current diet: YES       Pain Management:   Patient states pain is manageable on current regimen: YES    Skin:  Mc Score: 20  Interventions: increase time out of bed    Patient Safety:  Fall Score:  Total Score: 4  Interventions: pt to call before getting OOB  High Fall Risk: Yes    Length of Stay:  Expected LOS: - - -  Actual LOS: [facility], RN

## 2021-01-03 LAB
ALBUMIN SERPL-MCNC: 2.3 G/DL (ref 3.5–5)
ALBUMIN/GLOB SERPL: 0.5 {RATIO} (ref 1.1–2.2)
ALP SERPL-CCNC: 77 U/L (ref 45–117)
ALT SERPL-CCNC: 23 U/L (ref 12–78)
ANION GAP SERPL CALC-SCNC: 8 MMOL/L (ref 5–15)
AST SERPL-CCNC: 33 U/L (ref 15–37)
BASOPHILS # BLD: 0 K/UL (ref 0–0.1)
BASOPHILS NFR BLD: 0 % (ref 0–1)
BILIRUB SERPL-MCNC: 0.3 MG/DL (ref 0.2–1)
BUN SERPL-MCNC: 18 MG/DL (ref 6–20)
BUN/CREAT SERPL: 26 (ref 12–20)
CALCIUM SERPL-MCNC: 8.6 MG/DL (ref 8.5–10.1)
CHLORIDE SERPL-SCNC: 105 MMOL/L (ref 97–108)
CO2 SERPL-SCNC: 25 MMOL/L (ref 21–32)
CREAT SERPL-MCNC: 0.69 MG/DL (ref 0.55–1.02)
D DIMER PPP FEU-MCNC: 1.25 MG/L FEU (ref 0–0.65)
DIFFERENTIAL METHOD BLD: ABNORMAL
EOSINOPHIL # BLD: 0 K/UL (ref 0–0.4)
EOSINOPHIL NFR BLD: 0 % (ref 0–7)
ERYTHROCYTE [DISTWIDTH] IN BLOOD BY AUTOMATED COUNT: 13.4 % (ref 11.5–14.5)
FIBRINOGEN PPP-MCNC: 668 MG/DL (ref 200–475)
GLOBULIN SER CALC-MCNC: 4.7 G/DL (ref 2–4)
GLUCOSE BLD STRIP.AUTO-MCNC: 200 MG/DL (ref 65–100)
GLUCOSE BLD STRIP.AUTO-MCNC: 236 MG/DL (ref 65–100)
GLUCOSE BLD STRIP.AUTO-MCNC: 240 MG/DL (ref 65–100)
GLUCOSE BLD STRIP.AUTO-MCNC: 249 MG/DL (ref 65–100)
GLUCOSE SERPL-MCNC: 203 MG/DL (ref 65–100)
HCT VFR BLD AUTO: 39.9 % (ref 35–47)
HGB BLD-MCNC: 12.8 G/DL (ref 11.5–16)
IMM GRANULOCYTES # BLD AUTO: 0 K/UL (ref 0–0.04)
IMM GRANULOCYTES NFR BLD AUTO: 0 % (ref 0–0.5)
LYMPHOCYTES # BLD: 2.1 K/UL (ref 0.8–3.5)
LYMPHOCYTES NFR BLD: 16 % (ref 12–49)
MCH RBC QN AUTO: 28.4 PG (ref 26–34)
MCHC RBC AUTO-ENTMCNC: 32.1 G/DL (ref 30–36.5)
MCV RBC AUTO: 88.5 FL (ref 80–99)
MONOCYTES # BLD: 0.4 K/UL (ref 0–1)
MONOCYTES NFR BLD: 3 % (ref 5–13)
NEUTS BAND NFR BLD MANUAL: 1 %
NEUTS SEG # BLD: 10.6 K/UL (ref 1.8–8)
NEUTS SEG NFR BLD: 80 % (ref 32–75)
NRBC # BLD: 0.02 K/UL (ref 0–0.01)
NRBC BLD-RTO: 0.2 PER 100 WBC
PLATELET # BLD AUTO: 363 K/UL (ref 150–400)
PMV BLD AUTO: 9.3 FL (ref 8.9–12.9)
POTASSIUM SERPL-SCNC: 3.5 MMOL/L (ref 3.5–5.1)
PROT SERPL-MCNC: 7 G/DL (ref 6.4–8.2)
RBC # BLD AUTO: 4.51 M/UL (ref 3.8–5.2)
RBC MORPH BLD: ABNORMAL
SERVICE CMNT-IMP: ABNORMAL
SODIUM SERPL-SCNC: 138 MMOL/L (ref 136–145)
WBC # BLD AUTO: 13.1 K/UL (ref 3.6–11)
WBC MORPH BLD: ABNORMAL

## 2021-01-03 PROCEDURE — 74011000250 HC RX REV CODE- 250: Performed by: INTERNAL MEDICINE

## 2021-01-03 PROCEDURE — 82962 GLUCOSE BLOOD TEST: CPT

## 2021-01-03 PROCEDURE — 80053 COMPREHEN METABOLIC PANEL: CPT

## 2021-01-03 PROCEDURE — XW033E5 INTRODUCTION OF REMDESIVIR ANTI-INFECTIVE INTO PERIPHERAL VEIN, PERCUTANEOUS APPROACH, NEW TECHNOLOGY GROUP 5: ICD-10-PCS | Performed by: INTERNAL MEDICINE

## 2021-01-03 PROCEDURE — 85025 COMPLETE CBC W/AUTO DIFF WBC: CPT

## 2021-01-03 PROCEDURE — 85384 FIBRINOGEN ACTIVITY: CPT

## 2021-01-03 PROCEDURE — 65660000000 HC RM CCU STEPDOWN

## 2021-01-03 PROCEDURE — 85379 FIBRIN DEGRADATION QUANT: CPT

## 2021-01-03 PROCEDURE — 74011250637 HC RX REV CODE- 250/637: Performed by: INTERNAL MEDICINE

## 2021-01-03 PROCEDURE — 36415 COLL VENOUS BLD VENIPUNCTURE: CPT

## 2021-01-03 PROCEDURE — 74011000258 HC RX REV CODE- 258: Performed by: INTERNAL MEDICINE

## 2021-01-03 PROCEDURE — 94640 AIRWAY INHALATION TREATMENT: CPT

## 2021-01-03 PROCEDURE — 74011250636 HC RX REV CODE- 250/636: Performed by: INTERNAL MEDICINE

## 2021-01-03 PROCEDURE — 77010033678 HC OXYGEN DAILY

## 2021-01-03 PROCEDURE — 77010033711 HC HIGH FLOW OXYGEN

## 2021-01-03 RX ORDER — INSULIN GLARGINE 100 [IU]/ML
20 INJECTION, SOLUTION SUBCUTANEOUS
Status: DISCONTINUED | OUTPATIENT
Start: 2021-01-03 | End: 2021-01-05

## 2021-01-03 RX ORDER — INSULIN GLARGINE 100 [IU]/ML
25 INJECTION, SOLUTION SUBCUTANEOUS
Status: DISCONTINUED | OUTPATIENT
Start: 2021-01-03 | End: 2021-01-03

## 2021-01-03 RX ADMIN — ENOXAPARIN SODIUM 40 MG: 40 INJECTION SUBCUTANEOUS at 09:30

## 2021-01-03 RX ADMIN — GUAIFENESIN AND DEXTROMETHORPHAN 5 ML: 100; 10 SYRUP ORAL at 09:31

## 2021-01-03 RX ADMIN — ENOXAPARIN SODIUM 40 MG: 40 INJECTION SUBCUTANEOUS at 22:19

## 2021-01-03 RX ADMIN — REMDESIVIR 100 MG: 100 INJECTION, POWDER, LYOPHILIZED, FOR SOLUTION INTRAVENOUS at 21:24

## 2021-01-03 RX ADMIN — ZINC SULFATE 220 MG (50 MG) CAPSULE 1 CAPSULE: CAPSULE at 09:31

## 2021-01-03 RX ADMIN — ALBUTEROL SULFATE 2 PUFF: 90 AEROSOL, METERED RESPIRATORY (INHALATION) at 14:57

## 2021-01-03 RX ADMIN — Medication 10 ML: at 14:59

## 2021-01-03 RX ADMIN — OXYCODONE HYDROCHLORIDE AND ACETAMINOPHEN 1000 MG: 500 TABLET ORAL at 09:31

## 2021-01-03 RX ADMIN — LEVOFLOXACIN 750 MG: 5 INJECTION, SOLUTION INTRAVENOUS at 16:39

## 2021-01-03 RX ADMIN — CYCLOBENZAPRINE 10 MG: 10 TABLET, FILM COATED ORAL at 22:19

## 2021-01-03 RX ADMIN — PANTOPRAZOLE SODIUM 40 MG: 40 TABLET, DELAYED RELEASE ORAL at 09:31

## 2021-01-03 RX ADMIN — DEXAMETHASONE SODIUM PHOSPHATE 6 MG: 4 INJECTION, SOLUTION INTRAMUSCULAR; INTRAVENOUS at 09:31

## 2021-01-03 RX ADMIN — GUAIFENESIN AND DEXTROMETHORPHAN 5 ML: 100; 10 SYRUP ORAL at 17:02

## 2021-01-03 RX ADMIN — ATORVASTATIN CALCIUM 10 MG: 10 TABLET, FILM COATED ORAL at 22:19

## 2021-01-03 RX ADMIN — DILTIAZEM HYDROCHLORIDE 120 MG: 120 CAPSULE, COATED, EXTENDED RELEASE ORAL at 09:30

## 2021-01-03 RX ADMIN — ALBUTEROL SULFATE 2 PUFF: 90 AEROSOL, METERED RESPIRATORY (INHALATION) at 01:17

## 2021-01-03 RX ADMIN — ALBUTEROL SULFATE 2 PUFF: 90 AEROSOL, METERED RESPIRATORY (INHALATION) at 08:35

## 2021-01-03 RX ADMIN — Medication 10 ML: at 21:24

## 2021-01-03 RX ADMIN — GABAPENTIN 300 MG: 300 CAPSULE ORAL at 22:19

## 2021-01-03 NOTE — PROGRESS NOTES
1900 Verbal shift change report given to Teddy Durbin (oncoming nurse) by Jorge A Portillo (offgoing nurse). Report included the following information SBAR, Kardex, Intake/Output, Recent Results and Cardiac Rhythm NSR.     0300: transitioned pt from high flow nasal cannula to 6 L regular nasal cannula, tolerating well, O2 saturation 93%     End of Shift Note    Bedside shift change report given to RN (oncoming nurse) by Cosntance Goldsmith (offgoing nurse). Report included the following information SBAR, Kardex, Intake/Output, Recent Results and Cardiac Rhythm NSR    Shift worked:  7p-7a     Shift summary and any significant changes:     weaned to 5 L O2 on regular nasal cannula     Concerns for physician to address:  reports oral thrush and difficulty swallowing solid foods, would like some glucerna shakes     Zone phone for oncoming shift:          Activity:  Activity Level: Up with Assistance  Number times ambulated in hallways past shift: 0  Number of times OOB to chair past shift: 1    Cardiac:   Cardiac Monitoring: Yes      Cardiac Rhythm: Normal sinus rhythm    Access:   Current line(s): PIV     Genitourinary:   Urinary status: voiding    Respiratory:   O2 Device: Nasal cannula  Chronic home O2 use?: NO  Incentive spirometer at bedside: YES     GI:     Current diet:  DIET DIABETIC CONSISTENT CARB Regular  Passing flatus: YES  Tolerating current diet: NO       Pain Management:   Patient states pain is manageable on current regimen: YES    Skin:  Mc Score: 21  Interventions: increase time out of bed    Patient Safety:  Fall Score:  Total Score: 4  Interventions: gripper socks and pt to call before getting OOB  High Fall Risk: Yes    Length of Stay:  Expected LOS: - - -  Actual LOS: 2      Constance Goldsmith

## 2021-01-03 NOTE — PROGRESS NOTES
Hospitalist Progress Note    NAME: Emily Moser   :  1975   MRN:  [id number]       Assessment / Plan:  Acute hypoxic respiratory failure POA  COVID-19 pneumonia  -covid swab positive at PCP office   -patient from high flow yesterday to 4L NC today, maintaining sats  -cont dexamethaxone  -cont remdesivir  -wean O2 if able to maintain sats  -appreciate pulm consult  -zinc and vitamin C  -empiric levaquin    CTA chest:  No acute pulmonary embolism. Marked patchy bilateral lung opacities in keeping with atypical/viral  infection. Hepatic steatosis. DM1  -has insulin pump  -infuses novolin, usually about 75 units daily  -given steroids Tx, will likely need additional insulin dosing  -blood sugar remains in 200s  -add lantus 20 units qhs   -SSI with POCs    RA  -on enbreal, last dose   -previously on methotrexate     HTN  -cont CCB    HLD  -cont statin           Code Status: Full code, discussed with the patient  Surrogate Decision Maker: Spouse     DVT Prophylaxis: Lovenox  GI Prophylaxis: not indicated     Baseline: Ambulatory     PPx[de-identified] lovenox     Subjective:     Chief Complaint / Reason for Physician Visit  Asleep intially, arouses easily, no acute distress    Discussed with RN events overnight. Review of Systems:  Symptom Y/N Comments  Symptom Y/N Comments   Fever/Chills n   Chest Pain n    Poor Appetite n   Edema     Cough y   Abdominal Pain n    Sputum n   Joint Pain     SOB/POWERS y   Pruritis/Rash     Nausea/vomit n   Tolerating PT/OT     Diarrhea n   Tolerating Diet y    Constipation n   Other       Could NOT obtain due to:      Objective:     VITALS:   Last 24hrs VS reviewed since prior progress note.  Most recent are:  Patient Vitals for the past 24 hrs:   Temp Pulse Resp BP SpO2   21 1037 98 °F (36.7 °C) 83 22 115/61 100 %   21 0835     93 %   21 0740 98.5 °F (36.9 °C) 84 24 (!) 118/58 92 %   21 0618  77 25  98 %   21 0310 98.4 °F (36.9 °C) 94 27 (!) 130/59 91 %   01/03/21 0118     94 %   01/02/21 2353 97.7 °F (36.5 °C) 97 22 119/60 94 %   01/02/21 1937 97.7 °F (36.5 °C) (!) 114 28 (!) 126/46 91 %   01/02/21 1916     97 %   01/02/21 1600 99 °F (37.2 °C) (!) 103 24 (!) 129/53 96 %   01/02/21 1423     94 %       Intake/Output Summary (Last 24 hours) at 1/3/2021 1421  Last data filed at 1/2/2021 1703  Gross per 24 hour   Intake    Output 700 ml   Net -700 ml        I had a face to face encounter and independently examined this patient on 1/3/2021, as outlined below:  PHYSICAL EXAM:  General: WD, WN. Alert, cooperative, no acute distress    EENT:  EOMI. Anicteric sclerae. MMM  Resp:  Few rhonchi, no rales/wheezing. No accessory muscle use  CV:  Regular  rhythm,  No edema  GI:  Soft, Non distended, Non tender. +Bowel sounds  Neurologic:  Alert and oriented X 3, normal speech,   Psych:   Good insight. Not anxious nor agitated  Skin:  No rashes. No jaundice    Reviewed most current lab test results and cultures  YES  Reviewed most current radiology test results   YES  Review and summation of old records today    NO  Reviewed patient's current orders and MAR    YES  PMH/SH reviewed - no change compared to H&P  ________________________________________________________________________  Care Plan discussed with:    Comments   Patient x    Family      RN x    Care Manager     Consultant                        Multidiciplinary team rounds were held today with , nursing, pharmacist and clinical coordinator. Patient's plan of care was discussed; medications were reviewed and discharge planning was addressed.      ________________________________________________________________________  Total NON critical care TIME:  30   Minutes    Total CRITICAL CARE TIME Spent:   Minutes non procedure based      Comments   >50% of visit spent in counseling and coordination of care ________________________________________________________________________  Aditi Carranza DO     Procedures: see electronic medical records for all procedures/Xrays and details which were not copied into this note but were reviewed prior to creation of Plan. LABS:  I reviewed today's most current labs and imaging studies.   Pertinent labs include:  Recent Labs     01/03/21 0254 01/02/21 0313 01/01/21  1453   WBC 13.1* 6.7 7.5   HGB 12.8 13.0 14.2   HCT 39.9 40.9 44.3    263 286     Recent Labs     01/03/21 0254 01/02/21 0313 01/01/21  1453    136 136   K 3.5 4.2 3.2*    102 101   CO2 25 23 25   * 208* 167*   BUN 18 17 18   CREA 0.69 0.72 0.87   CA 8.6 8.4* 8.6   MG  --  2.4  --    ALB 2.3*  --  2.7*   TBILI 0.3  --  0.6   ALT 23  --  23   INR  --   --  1.0       Signed: Aditi Carranza DO

## 2021-01-03 NOTE — PROGRESS NOTES
End of Shift Note    Bedside shift change report given to [address] [place]  (oncoming nurse) by Bonny Lanza RN (offgoing nurse). Report included the following information SBAR, Kardex, Intake/Output and MAR    Shift worked:  7a-7p     Shift summary and any significant changes:    Patient on 4L NC today, tolerating well. Patient did desat with movement to BSC to 80% but recovered quickly. Poor appetite, complains of soreness/swallowing difficulty, was able to drink glucerna today. Concerns for physician to address: Nutrition and oxygen   Zone phone for oncoming shift:   [phone]       Activity:  Activity Level: Up with Assistance  Number times ambulated in hallways past shift: 0  Number of times OOB to chair past shift: 0    Cardiac:   Cardiac Monitoring: Yes      Cardiac Rhythm: Sinus tachycardia    Access:   Current line(s): PIV     Genitourinary:   Urinary status: voiding    Respiratory:   O2 Device: Nasal cannula  Chronic home O2 use?: NO  Incentive spirometer at bedside: YES     GI:  Last Bowel Movement Date: 01/02/21  Current diet:  DIET DIABETIC CONSISTENT CARB Regular  DIET NUTRITIONAL SUPPLEMENTS All Meals; Glucerna Shake  Passing flatus: YES  Tolerating current diet: NO  % Diet Eaten: 0 %    Pain Management:   Patient states pain is manageable on current regimen: N/A    Skin:  Mc Score: 19  Interventions: increase time out of bed    Patient Safety:  Fall Score:  Total Score: 4  Interventions: pt to call before getting OOB  High Fall Risk: Yes    Length of Stay:  Expected LOS: - - -  Actual LOS: 2      Bonny Lanza RN

## 2021-01-04 LAB
ANION GAP SERPL CALC-SCNC: 5 MMOL/L (ref 5–15)
BASOPHILS # BLD: 0 K/UL (ref 0–0.1)
BASOPHILS NFR BLD: 0 % (ref 0–1)
BUN SERPL-MCNC: 22 MG/DL (ref 6–20)
BUN/CREAT SERPL: 26 (ref 12–20)
CALCIUM SERPL-MCNC: 8.9 MG/DL (ref 8.5–10.1)
CHLORIDE SERPL-SCNC: 107 MMOL/L (ref 97–108)
CO2 SERPL-SCNC: 27 MMOL/L (ref 21–32)
CREAT SERPL-MCNC: 0.84 MG/DL (ref 0.55–1.02)
CRP SERPL HS-MCNC: >9.5 MG/L
D DIMER PPP FEU-MCNC: 0.68 MG/L FEU (ref 0–0.65)
DIFFERENTIAL METHOD BLD: ABNORMAL
EOSINOPHIL # BLD: 0 K/UL (ref 0–0.4)
EOSINOPHIL NFR BLD: 0 % (ref 0–7)
ERYTHROCYTE [DISTWIDTH] IN BLOOD BY AUTOMATED COUNT: 13.9 % (ref 11.5–14.5)
FERRITIN SERPL-MCNC: 344 NG/ML (ref 26–388)
FIBRINOGEN PPP-MCNC: 590 MG/DL (ref 200–475)
GLUCOSE BLD STRIP.AUTO-MCNC: 100 MG/DL (ref 65–100)
GLUCOSE BLD STRIP.AUTO-MCNC: 190 MG/DL (ref 65–100)
GLUCOSE BLD STRIP.AUTO-MCNC: 264 MG/DL (ref 65–100)
GLUCOSE BLD STRIP.AUTO-MCNC: 324 MG/DL (ref 65–100)
GLUCOSE BLD STRIP.AUTO-MCNC: 346 MG/DL (ref 65–100)
GLUCOSE SERPL-MCNC: 209 MG/DL (ref 65–100)
HCT VFR BLD AUTO: 43.4 % (ref 35–47)
HGB BLD-MCNC: 14.1 G/DL (ref 11.5–16)
IMM GRANULOCYTES # BLD AUTO: 0 K/UL (ref 0–0.04)
IMM GRANULOCYTES NFR BLD AUTO: 0 % (ref 0–0.5)
LYMPHOCYTES # BLD: 1.7 K/UL (ref 0.8–3.5)
LYMPHOCYTES NFR BLD: 12 % (ref 12–49)
MCH RBC QN AUTO: 28.8 PG (ref 26–34)
MCHC RBC AUTO-ENTMCNC: 32.5 G/DL (ref 30–36.5)
MCV RBC AUTO: 88.6 FL (ref 80–99)
MONOCYTES # BLD: 0.4 K/UL (ref 0–1)
MONOCYTES NFR BLD: 3 % (ref 5–13)
NEUTS SEG # BLD: 12.1 K/UL (ref 1.8–8)
NEUTS SEG NFR BLD: 85 % (ref 32–75)
NRBC # BLD: 0 K/UL (ref 0–0.01)
NRBC BLD-RTO: 0 PER 100 WBC
PLATELET # BLD AUTO: 425 K/UL (ref 150–400)
PMV BLD AUTO: 10 FL (ref 8.9–12.9)
POTASSIUM SERPL-SCNC: 3.9 MMOL/L (ref 3.5–5.1)
RBC # BLD AUTO: 4.9 M/UL (ref 3.8–5.2)
RBC MORPH BLD: ABNORMAL
SERVICE CMNT-IMP: ABNORMAL
SERVICE CMNT-IMP: NORMAL
SODIUM SERPL-SCNC: 139 MMOL/L (ref 136–145)
WBC # BLD AUTO: 14.2 K/UL (ref 3.6–11)

## 2021-01-04 PROCEDURE — 74011250637 HC RX REV CODE- 250/637: Performed by: INTERNAL MEDICINE

## 2021-01-04 PROCEDURE — 74011000250 HC RX REV CODE- 250: Performed by: INTERNAL MEDICINE

## 2021-01-04 PROCEDURE — 82962 GLUCOSE BLOOD TEST: CPT

## 2021-01-04 PROCEDURE — 65660000000 HC RM CCU STEPDOWN

## 2021-01-04 PROCEDURE — 80048 BASIC METABOLIC PNL TOTAL CA: CPT

## 2021-01-04 PROCEDURE — 74011000258 HC RX REV CODE- 258: Performed by: INTERNAL MEDICINE

## 2021-01-04 PROCEDURE — 99356 PR PROLONGED SVC I/P OR OBS SETTING 1ST HOUR: CPT | Performed by: CLINICAL NURSE SPECIALIST

## 2021-01-04 PROCEDURE — 99233 SBSQ HOSP IP/OBS HIGH 50: CPT | Performed by: CLINICAL NURSE SPECIALIST

## 2021-01-04 PROCEDURE — 85384 FIBRINOGEN ACTIVITY: CPT

## 2021-01-04 PROCEDURE — 77010033678 HC OXYGEN DAILY

## 2021-01-04 PROCEDURE — 74011250636 HC RX REV CODE- 250/636: Performed by: INTERNAL MEDICINE

## 2021-01-04 PROCEDURE — 86141 C-REACTIVE PROTEIN HS: CPT

## 2021-01-04 PROCEDURE — 82728 ASSAY OF FERRITIN: CPT

## 2021-01-04 PROCEDURE — 85379 FIBRIN DEGRADATION QUANT: CPT

## 2021-01-04 PROCEDURE — 85025 COMPLETE CBC W/AUTO DIFF WBC: CPT

## 2021-01-04 PROCEDURE — 36415 COLL VENOUS BLD VENIPUNCTURE: CPT

## 2021-01-04 RX ORDER — ALPRAZOLAM 0.5 MG/1
0.5 TABLET ORAL
Status: DISCONTINUED | OUTPATIENT
Start: 2021-01-04 | End: 2021-01-07 | Stop reason: HOSPADM

## 2021-01-04 RX ADMIN — ENOXAPARIN SODIUM 40 MG: 40 INJECTION SUBCUTANEOUS at 08:48

## 2021-01-04 RX ADMIN — REMDESIVIR 100 MG: 100 INJECTION, POWDER, LYOPHILIZED, FOR SOLUTION INTRAVENOUS at 21:45

## 2021-01-04 RX ADMIN — ENOXAPARIN SODIUM 40 MG: 40 INJECTION SUBCUTANEOUS at 21:51

## 2021-01-04 RX ADMIN — LEVOFLOXACIN 750 MG: 5 INJECTION, SOLUTION INTRAVENOUS at 18:25

## 2021-01-04 RX ADMIN — OXYCODONE HYDROCHLORIDE AND ACETAMINOPHEN 1000 MG: 500 TABLET ORAL at 08:48

## 2021-01-04 RX ADMIN — ZINC SULFATE 220 MG (50 MG) CAPSULE 1 CAPSULE: CAPSULE at 08:48

## 2021-01-04 RX ADMIN — PANTOPRAZOLE SODIUM 40 MG: 40 TABLET, DELAYED RELEASE ORAL at 08:48

## 2021-01-04 RX ADMIN — Medication 10 ML: at 21:47

## 2021-01-04 RX ADMIN — GABAPENTIN 300 MG: 300 CAPSULE ORAL at 21:50

## 2021-01-04 RX ADMIN — DILTIAZEM HYDROCHLORIDE 120 MG: 120 CAPSULE, COATED, EXTENDED RELEASE ORAL at 08:48

## 2021-01-04 RX ADMIN — GUAIFENESIN AND DEXTROMETHORPHAN 5 ML: 100; 10 SYRUP ORAL at 08:48

## 2021-01-04 RX ADMIN — CYCLOBENZAPRINE 10 MG: 10 TABLET, FILM COATED ORAL at 21:50

## 2021-01-04 RX ADMIN — GUAIFENESIN AND DEXTROMETHORPHAN 5 ML: 100; 10 SYRUP ORAL at 18:25

## 2021-01-04 RX ADMIN — ATORVASTATIN CALCIUM 10 MG: 10 TABLET, FILM COATED ORAL at 21:50

## 2021-01-04 RX ADMIN — DEXAMETHASONE SODIUM PHOSPHATE 6 MG: 4 INJECTION, SOLUTION INTRAMUSCULAR; INTRAVENOUS at 08:48

## 2021-01-04 NOTE — PROGRESS NOTES
TRANSFER - OUT REPORT:    Verbal report given to Justina Lee RN(name) on Robert Miranda  being transferred to [facility](unit) for urgent transfer       Report consisted of patients Situation, Background, Assessment and   Recommendations(SBAR). Information from the following report(s) SBAR, Kardex and Cardiac Rhythm NSR was reviewed with the receiving nurse. Lines:   Peripheral IV 01/01/21 Left Antecubital (Active)   Site Assessment Clean, dry, & intact 01/03/21 0740   Phlebitis Assessment 0 01/03/21 0740   Infiltration Assessment 0 01/03/21 0740   Dressing Status Clean, dry, & intact 01/03/21 0740   Dressing Type Transparent;Tape 01/03/21 0740   Hub Color/Line Status Pink;Capped 01/03/21 0740   Alcohol Cap Used Yes 01/03/21 0740       Subcutaneous Insulin Infusion Device 01/01/21 (Active)   Site Assessment Clean, dry, & intact 01/03/21 0740   Dressing Status Clean, dry, & intact 01/03/21 0740   Pump continued on admission? Yes 01/03/21 0740   Patient able to care for pump? Yes 01/03/21 0740   Patient reported basal rate  2 01/03/21 0740   Usual carb ratio (per pt report) 5.0 01/01/21 2330   [place] volume at admission (mL) 234 mL 01/01/21 2330   Total usual reservoir volume (mL) 300 mL 01/01/21 2330   BOLUS (in Units) given via pump 2.8 01/03/21 1637        Opportunity for questions and clarification was provided.       Patient transported with:   Monitor  O2 @ 4 liters  Patient-specific medications from Pharmacy  Registered Nurse

## 2021-01-04 NOTE — PROGRESS NOTES
SPEECH THERAPY SCREENING:  SERVICES ARE INDICATED since she failed the STAND. If she is not tolerating her diet please request a consult from the physician for swallowing eval.     An InBasket screening referral was triggered for speech therapy based on results obtained during the nursing admission assessment. The patients chart was reviewed and the patient is appropriate for a skilled therapy evaluation. Please order a consult for speech therapy if you are in agreement and would like an evaluation to be completed. Thank you.     Brooke Ken, SLP

## 2021-01-04 NOTE — PROGRESS NOTES
Hospitalist Progress Note    NAME: Parker Hughes   :  1975   MRN:  [id number]       Assessment / Plan:  Acute hypoxic respiratory failure POA  COVID-19 pneumonia  -covid swab positive at PCP office   -weaned from high flow NC O2. To 4L NC 1/3 and so far maintaining sats on 2L  -cont to wean NC O2 as able  -hopefully home soon   -cont dexamethaxone  -cont remdesivir  -appreciate pulm consult  -zinc and vitamin C  -empiric levaquin    CTA chest:  No acute pulmonary embolism. Marked patchy bilateral lung opacities in keeping with atypical/viral  infection. Hepatic steatosis. Anxiety/depression  -patient anxious/tearful and just learned her father who is also Covid + [facility]  -listed without interruption and answered her questions  -she declines medicines for anxiety now but will order prn    DM1  -has insulin pump  -infuses novolin, usually about 75 units daily  -given steroids Tx, will likely need additional insulin dosing  -blood sugar remains in 200s  -add lantus 20 units qhs   -SSI with POCs    RA  -on enbreal, last dose   -previously on methotrexate     HTN  -cont CCB    HLD  -cont statin           Code Status: Full code, discussed with the patient  Surrogate Decision Maker: Spouse     DVT Prophylaxis: Lovenox  GI Prophylaxis: not indicated     Baseline: Ambulatory     PPx[de-identified] lovenox     Subjective:     Chief Complaint / Reason for Physician Visit  Tearful as she is learning her father with Covid is requiring ventilator. Breathing improved. No events overnight, weaning from NC O2 and maintaining sats    Discussed with RN events overnight.      Review of Systems:  Symptom Y/N Comments  Symptom Y/N Comments   Fever/Chills n   Chest Pain n    Poor Appetite n   Edema     Cough y   Abdominal Pain n    Sputum n   Joint Pain     SOB/POWERS y   Pruritis/Rash     Nausea/vomit n   Tolerating PT/OT     Diarrhea n   Tolerating Diet y    Constipation n   Other       Could NOT obtain due to:      Objective:     VITALS:   Last 24hrs VS reviewed since prior progress note. Most recent are:  Patient Vitals for the past 24 hrs:   Temp Pulse Resp BP SpO2   01/04/21 1446 98 °F (36.7 °C) 96 20 125/64 91 %   01/04/21 1114 98.4 °F (36.9 °C) 71 20 131/72 93 %   01/04/21 0756 98.5 °F (36.9 °C) 68 20 131/68 93 %   01/04/21 0433 97.9 °F (36.6 °C) 72 22 122/68 93 %   01/03/21 2258 98.7 °F (37.1 °C) 76 22 (!) 145/92 99 %   01/03/21 2120 98.1 °F (36.7 °C) 99 24 132/70 92 %   01/03/21 2013 98.3 °F (36.8 °C) 97 24 139/69 94 %       Intake/Output Summary (Last 24 hours) at 1/4/2021 1746  Last data filed at 1/4/2021 1114  Gross per 24 hour   Intake 600 ml   Output    Net 600 ml        I had a face to face encounter and independently examined this patient on 1/4/2021, as outlined below:  PHYSICAL EXAM:  General: WD, WN. Alert, cooperative, no acute distress    EENT:  EOMI. Anicteric sclerae. MMM  Resp:  Few rhonchi, no rales/wheezing. No accessory muscle use  CV:  Regular  rhythm,  No edema  GI:  Soft, Non distended, Non tender. +Bowel sounds  Neurologic:  Alert and oriented X 3, normal speech,   Psych:   Good insight. Not anxious nor agitated  Skin:  No rashes. No jaundice    Reviewed most current lab test results and cultures  YES  Reviewed most current radiology test results   YES  Review and summation of old records today    NO  Reviewed patient's current orders and MAR    YES  PMH/SH reviewed - no change compared to H&P  ________________________________________________________________________  Care Plan discussed with:    Comments   Patient x    Family      RN x    Care Manager     Consultant                        Multidiciplinary team rounds were held today with , nursing, pharmacist and clinical coordinator. Patient's plan of care was discussed; medications were reviewed and discharge planning was addressed. ________________________________________________________________________  Total NON critical care TIME:  30   Minutes    Total CRITICAL CARE TIME Spent:   Minutes non procedure based      Comments   >50% of visit spent in counseling and coordination of care     ________________________________________________________________________  Tej Pimentel DO     Procedures: see electronic medical records for all procedures/Xrays and details which were not copied into this note but were reviewed prior to creation of Plan. LABS:  I reviewed today's most current labs and imaging studies.   Pertinent labs include:  Recent Labs     01/04/21  0900 01/03/21  0254 01/02/21 0313   WBC 14.2* 13.1* 6.7   HGB 14.1 12.8 13.0   HCT 43.4 39.9 40.9   * 363 263     Recent Labs     01/04/21  1412 01/03/21  0254 01/02/21 0313    138 136   K 3.9 3.5 4.2    105 102   CO2 27 25 23   * 203* 208*   BUN 22* 18 17   CREA 0.84 0.69 0.72   CA 8.9 8.6 8.4*   MG  --   --  2.4   ALB  --  2.3*  --    TBILI  --  0.3  --    ALT  --  23  --        Signed: Tej Pimentel DO

## 2021-01-04 NOTE — DIABETES MGMT
[facility]    CLINICAL NURSE SPECIALIST CONSULT NOTE  Initial note    Presentation   Eun Johnson is a 41 y.o. female admitted 1/1/21 with SOB and COVID +. reported that she was diagnosed with COVID 3 days prior to coming to the ED, after a test at her PCP. Began having symptoms on 12/24 with body aches, fever, SOB, fatigue, and cough.  also has COVID-19. CXR:   Diffuse bilateral patchy airspace disease is compatible with COVID   pneumonia. HX:   Past Medical History:   Diagnosis Date    Diabetes ([facility] [address]     Migraine     RA (rheumatoid [facility]) ([facility])      DX: COVID-19 PNA. Acute respiratory failure with hypoxia. History of RA (on enbrel). Diabetes. [place]: insulin pump. Vit C. Statin. Steroid. Neurotin. IV ABX. Gi prophylaxis. Remdesivir. Zinc. Clot prevention. resp meds. Diabetes: Patient has known Type 1 diabetes, treated with insulin PTA. Family history positive for diabetes in multiple family members. Admission  and A1c unavailable indicates variable diabetes control. Patient was diagnosed with diabetes about 35 years ago and has been on an insulin pump for the last 8 years. Dr. Antoni Feliciano, her PCP manages her diabetes. Consulted by Provider for advanced diabetes nursing assessment and care, specifically related to    [x] Inpatient management strategy    Diabetes-related medical history  Neurological complications  Gastroparesis and Peripheral neuropathy  Microvascular disease  Retinopathy    Diabetes medication history  Drug class Currently in use Discontinued Never used   Biguanide      DDP-4 inhibitor       Sulfonylurea      Thiazolidinedione      GLP-1 RA      SGLT-2 inhibitors Farxiga 5mg daily (started abuot 3 months ago)     Basal insulin Insulin pump (see settings below)     Bolus insulin      Fixed Dose  Combinations        Subjective   I initially thought I had Type 1 but then later on they said I had Type 2, so I don't know.     Patient reports the following home diabetes self-care practices:  Eating pattern: because of gastroparesis she eats only when she feels hungry or her BG is low. Has trouble eating because then her stomach feels too full. [x] Lunch  Raw veggies and maybe a glucerna shake  [x] Dinner  Small protein, beans  Physical activity pattern: lives at home with her . Not very active since COVID. Monitoring pattern: checks BG three times a day and states that it can be anywhere from 180-230s here recently since Juan. Never sees a BG lower than 90s. Taking medications pattern  [x] Consistent administration  [x] Affordable    Objective   Physical exam  General Alert, oriented but ill-appearing. Conversant and cooperative. Vital Signs On 3L NC. Normotensive. Afebrile. NSR. Visit Vitals  /64 (BP 1 Location: Right arm, BP Patient Position: At rest)   Pulse 96   Temp 98 °F (36.7 °C)   Resp 20   Ht 5' 1\" (1.549 m)   Wt 99.4 kg (219 lb 1.6 oz)   SpO2 91%   BMI 41.40 kg/m²      Laboratory  Tests Today 1/3 1/2 1/1   A1c        203 208 167   Anion gap 5 8 11 25   Serum triglycerides       WBC 14.2 13.1 6.7 7.5   Serum creatinine 0.84 0.69 0.72 0.87   GFR >60 >60 >60 >60   AST  33  38   ALT  23  23   Procal  0.17  0.19   ferritin   371 391   CRP    17.70     Factors affecting BG management  Factor Dose Comments   Nutrition:  Carb-controlled meals  With glucerna shakes   60 grams/meal   Ate 50% of breakfast and lunch per nursing documentation     Drugs:  Steroids  Other: Vitamin C   Decadron 6mg Daily     First dose 1/2/21  Can affect POC glucose   Pain PRN percocet available Patient states no pain   Infection: COVID-19 PNA  Afebrile.  WBC 7.5     Blood glucose pattern        Assessment and Plan   Nursing Diagnosis Risk for unstable blood glucose pattern   Nursing Intervention Domain 4282 Decision-making Support   Nursing Interventions Examined current inpatient diabetes control   Explored factors facilitating and impeding inpatient management  Identified self-management practices impeding diabetes control  Explored corrective strategies with patient and responsible inpatient provider   Informed patient of rational for insulin strategy while hospitalized  Instructed patient in how steroids can impact BG     Evaluation   This woman, with Type 1 diabetes, did not achieve diabetes control prior to admission (PTA), as evidenced by admission BG of 167 and A1c unavailable. Based on assessment of diabetes self-care practices, interventions that warrant action while hospitalized include:   [x] Healthy Eating   [x] Problem Solving  [x] Being Active   [x] Healthy Coping  [x] Monitoring   [x] Reducing Risks  [x] Taking Medications  The patient would also benefit from diabetes self-management education and support [facility]) after discharge. During this hospitalization, the patient has not achieved inpatient blood glucose target of 100-180mg/dl. On admission she was given 10 mg of decadron IV and 40 mg of methylprednisolone before being started on a a scheduled daily dose of steroid. BG range has been in the 200s over the last 48 hours. She has been using her own insulin pump (670G Medtronic pump) and her settings are as follows:  Carb ratio 1:5  Sensitivity 40  Target range   Active insulin time 3hrs  Bolus increment 0.1units  Max basal is 2.0 units  Max bolus is 25 units   Auto suspend is 12hrs. This afternoon patient's BG cristy into the 300s, however daily steroid had been given and she had eaten more for lunch than she stated she had been able to tolerate in recent days. Per her pump she corrected herself with 5.8 units for a BG of 346.  Will need to discuss further with her tomorrow whether she feels comfortable taking her pump off auto mode and placing into manual so she can set a new basal rate to account for the increase affect on BG related to steroid, this way the insulin pump and CGM can do the work in helping to control BG. Otherwise another approach would be to give a dose of glargine with the steroid and also use her insulin pump as she is used to with her basal bolus regime. Factors that have played a role include:  [x] Critical nature of illness state  [x] Glucocorticoid use    Recommendations   Recommend:    Continue to allow patient to use her own insulin pump and use corrective bolus insulin through this to override the impact of steroids. Order A1C    Continue POC glucose checks    [x] Referral to  [x] Diabetes Self-Management Training through [facility] (Phone [phone] to schedule appointment)    Billing Code(s)   I personally reviewed medical record, including notes, data and current medications, and examined the patient at bedside before making care recommendations.      [] [id number] IP subsequent hospital care - 35 minutes [x] [id number] Prolonged Services - 65 minutes [] [id number] Prolonged Services - 110 minutes  [] [id number] IP subsequent hospital care - 25 minutes [] [id number] Prolonged Services - 55 minutes [] [id number] Prolonged Services - 100 minutes  [] [id number] IP subsequent hospital care - 15 minutes [] [id number] Prolonged Services - 45 minutes [] [id number] Prolonged Services - 90 minutes    Caio Bailey MSN, RN, ACCNS-AG  Caio Bailey, CNS  Diabetes Clinical Nurse Specialist  [facility]  Access via [employer]

## 2021-01-04 NOTE — PROGRESS NOTES
Problem: Falls - Risk of  Goal: *Absence of Falls  Description: Document Bard Holly Fall Risk and appropriate interventions in the flowsheet. Outcome: Progressing Towards Goal  Note: Fall Risk Interventions:  Mobility Interventions: Assess mobility with egress test, Bed/chair exit alarm, Patient to call before getting OOB, PT Consult for mobility concerns, Strengthening exercises (ROM-active/passive)         Medication Interventions: Assess postural VS orthostatic hypotension, Evaluate medications/consider consulting pharmacy, Patient to call before getting OOB, Teach patient to arise slowly    Elimination Interventions: Bed/chair exit alarm, Call light in reach, Patient to call for help with toileting needs, Toileting schedule/hourly rounds    History of Falls Interventions: Bed/chair exit alarm, Room close to nurse's station         Problem: Patient Education: Go to Patient Education Activity  Goal: Patient/Family Education  Outcome: Progressing Towards Goal     Problem: Breathing Pattern - Ineffective  Goal: *Absence of hypoxia  Outcome: Progressing Towards Goal  Goal: *Use of effective breathing techniques  Outcome: Progressing Towards Goal  Goal: *PALLIATIVE CARE:  Alleviation of Dyspnea  Outcome: Progressing Towards Goal     Problem: Patient Education: Go to Patient Education Activity  Goal: Patient/Family Education  Outcome: Progressing Towards Goal     Problem: Airway Clearance - Ineffective  Goal: Achieve or maintain patent airway  Outcome: Progressing Towards Goal     Problem: Gas Exchange - Impaired  Goal: Absence of hypoxia  Outcome: Progressing Towards Goal  Goal: Promote optimal lung function  Outcome: Progressing Towards Goal     Problem: Breathing Pattern - Ineffective  Goal: Ability to achieve and maintain a regular respiratory rate  Outcome: Progressing Towards Goal     Problem:  Body Temperature -  Risk of, Imbalanced  Goal: Ability to maintain a body temperature within defined limits  Outcome: Progressing Towards Goal  Goal: Will regain or maintain usual level of consciousness  Outcome: Progressing Towards Goal  Goal: Complications related to the disease process, condition or treatment will be avoided or minimized  Outcome: Progressing Towards Goal     Problem: Isolation Precautions - Risk of Spread of Infection  Goal: Prevent transmission of infectious organism to others  Outcome: Progressing Towards Goal     Problem: Nutrition Deficits  Goal: Optimize nutrtional status  Outcome: Progressing Towards Goal     Problem: Risk for Fluid Volume Deficit  Goal: Maintain normal heart rhythm  Outcome: Progressing Towards Goal  Goal: Maintain absence of muscle cramping  Outcome: Progressing Towards Goal  Goal: Maintain normal serum potassium, sodium, calcium, phosphorus, and pH  Outcome: Progressing Towards Goal     Problem: Loneliness or Risk for Loneliness  Goal: Demonstrate positive use of time alone when socialization is not possible  Outcome: Progressing Towards Goal     Problem: Fatigue  Goal: Verbalize increase energy and improved vitality  Outcome: Progressing Towards Goal     Problem: Patient Education: Go to Patient Education Activity  Goal: Patient/Family Education  Outcome: Progressing Towards Goal     Problem: Patient Education: Go to Patient Education Activity  Goal: Patient/Family Education  Outcome: Progressing Towards Goal     Problem: Pneumonia: Day 4  Goal: Off Pathway (Use only if patient is Off Pathway)  Outcome: Progressing Towards Goal  Goal: Activity/Safety  Outcome: Progressing Towards Goal  Goal: Nutrition/Diet  Outcome: Progressing Towards Goal  Goal: Discharge Planning  Outcome: Progressing Towards Goal  Goal: Medications  Outcome: Progressing Towards Goal  Goal: Respiratory  Outcome: Progressing Towards Goal  Goal: Treatments/Interventions/Procedures  Outcome: Progressing Towards Goal  Goal: Psychosocial  Outcome: Progressing Towards Goal

## 2021-01-04 NOTE — PROGRESS NOTES
Chart reviewed  VSS pt on NC O2 with adequate sats  sats 92-99%  Pt on decadron, on remdesivir  On empiric abx  Continue current care  Will follow SYLVIE Dunn MD

## 2021-01-04 NOTE — PROGRESS NOTES
Problem: Falls - Risk of  Goal: *Absence of Falls  Description: Document Jose Sol Fall Risk and appropriate interventions in the flowsheet. Outcome: Progressing Towards Goal  Note: Fall Risk Interventions:  Mobility Interventions: Bed/chair exit alarm, Communicate number of staff needed for ambulation/transfer, OT consult for ADLs, Patient to call before getting OOB, PT Consult for mobility concerns, PT Consult for assist device competence         Medication Interventions: Bed/chair exit alarm, Patient to call before getting OOB, Teach patient to arise slowly    Elimination Interventions: Bed/chair exit alarm, Call light in reach, Patient to call for help with toileting needs, Stay With Me (per policy), Toileting schedule/hourly rounds, Toilet paper/wipes in reach    History of Falls Interventions: Bed/chair exit alarm, Investigate reason for fall, Room close to nurse's station         Problem: Patient Education: Go to Patient Education Activity  Goal: Patient/Family Education  Outcome: Progressing Towards Goal     Problem: Breathing Pattern - Ineffective  Goal: *Absence of hypoxia  Outcome: Progressing Towards Goal  Goal: *Use of effective breathing techniques  Outcome: Progressing Towards Goal     Problem:  Body Temperature -  Risk of, Imbalanced  Goal: Ability to maintain a body temperature within defined limits  Outcome: Progressing Towards Goal  Goal: Will regain or maintain usual level of consciousness  Outcome: Progressing Towards Goal  Goal: Complications related to the disease process, condition or treatment will be avoided or minimized  Outcome: Progressing Towards Goal     Problem: Isolation Precautions - Risk of Spread of Infection  Goal: Prevent transmission of infectious organism to others  Outcome: Progressing Towards Goal

## 2021-01-04 NOTE — PROGRESS NOTES
End of Shift Note    Bedside shift change report given to Reji Caceres (oncoming nurse) by Gómez Vieira (offgoing nurse). Report included the following information SBAR and Kardex    Shift worked:  [id number]     Shift summary and any significant changes:     Unable to get labs this morning    Weened O2 to 3L NC      Concerns for physician to address:       Zone phone for oncoming shift:          Activity:  Activity Level: Up with Assistance  Number times ambulated in hallways past shift: 0  Number of times OOB to chair past shift: 0    Cardiac:   Cardiac Monitoring: Yes      Cardiac Rhythm: Normal sinus rhythm    Access:   Current line(s): PIV     Genitourinary:   Urinary status: voiding    Respiratory:   O2 Device: Nasal cannula  Chronic home O2 use?: NO  Incentive spirometer at bedside: NO     GI:  Last Bowel Movement Date: 01/02/21  Current diet:  DIET DIABETIC CONSISTENT CARB Regular  DIET NUTRITIONAL SUPPLEMENTS All Meals; Glucerna Shake  Passing flatus: YES  Tolerating current diet: YES  % Diet Eaten: 0 %    Pain Management:   Patient states pain is manageable on current regimen: YES    Skin:  Mc Score: 19  Interventions: increase time out of bed and PT/OT consult    Patient Safety:  Fall Score:  Total Score: 4  Interventions: bed/chair alarm, assistive device (walker, cane, etc), gripper socks, pt to call before getting OOB and stay with me (per policy)  High Fall Risk: Yes    Length of Stay:  Expected LOS: - - -  Actual LOS: [address]

## 2021-01-04 NOTE — PROGRESS NOTES
Reason for Admission:   Covid ruleout                   RUR Score:          8           Plan for utilizing home health:      Not anticipated    PCP: First and Last name:  Cassius Adkins   Name of Practice: [facility]   Are you a current patient: Yes/No: yes   Approximate date of last visit: Dec 2020   Can you participate in a virtual visit with your PCP: yes                    Current Advanced Directive/Advance Care Plan: none in [facility]                         Transition of Care Plan:                    -confirm transport at d/c  -assess need for home oxygen//HHC  -make PCP followup  -confirm DME at home    Pt lives with spouse in [facility], uses [facility] in [place], chart states she has cane//RW, has several allergies, and is presently on oxygen. Will follow to assist with any d/c planning needs. CM acknowledge inpatient admission. CM will follow pt for consults and any needs prior to discharge. If any concerns or questions arise pertaining to pt discharge, please contact unit CM. Care Management Interventions  PCP Verified by CM:  Yes  Discharge Durable Medical Equipment: No  Physical Therapy Consult: No  Occupational Therapy Consult: No  Current Support Network: Lives with Spouse  Confirm Follow Up Transport: Family  Discharge Location  Discharge Placement: Home

## 2021-01-05 LAB
ALBUMIN SERPL-MCNC: 2.3 G/DL (ref 3.5–5)
ALBUMIN/GLOB SERPL: 0.6 {RATIO} (ref 1.1–2.2)
ALP SERPL-CCNC: 70 U/L (ref 45–117)
ALT SERPL-CCNC: 26 U/L (ref 12–78)
ANION GAP SERPL CALC-SCNC: 6 MMOL/L (ref 5–15)
AST SERPL-CCNC: 24 U/L (ref 15–37)
BILIRUB SERPL-MCNC: 0.3 MG/DL (ref 0.2–1)
BUN SERPL-MCNC: 20 MG/DL (ref 6–20)
BUN/CREAT SERPL: 29 (ref 12–20)
CALCIUM SERPL-MCNC: 8.1 MG/DL (ref 8.5–10.1)
CHLORIDE SERPL-SCNC: 111 MMOL/L (ref 97–108)
CO2 SERPL-SCNC: 25 MMOL/L (ref 21–32)
CREAT SERPL-MCNC: 0.69 MG/DL (ref 0.55–1.02)
D DIMER PPP FEU-MCNC: 0.51 MG/L FEU (ref 0–0.65)
FERRITIN SERPL-MCNC: 254 NG/ML (ref 8–252)
FIBRINOGEN PPP-MCNC: 476 MG/DL (ref 200–475)
GLOBULIN SER CALC-MCNC: 3.9 G/DL (ref 2–4)
GLUCOSE BLD STRIP.AUTO-MCNC: 121 MG/DL (ref 65–100)
GLUCOSE BLD STRIP.AUTO-MCNC: 225 MG/DL (ref 65–100)
GLUCOSE BLD STRIP.AUTO-MCNC: 240 MG/DL (ref 65–100)
GLUCOSE BLD STRIP.AUTO-MCNC: 245 MG/DL (ref 65–100)
GLUCOSE BLD STRIP.AUTO-MCNC: 317 MG/DL (ref 65–100)
GLUCOSE BLD STRIP.AUTO-MCNC: 65 MG/DL (ref 65–100)
GLUCOSE BLD STRIP.AUTO-MCNC: 66 MG/DL (ref 65–100)
GLUCOSE BLD STRIP.AUTO-MCNC: 80 MG/DL (ref 65–100)
GLUCOSE BLD STRIP.AUTO-MCNC: 94 MG/DL (ref 65–100)
GLUCOSE SERPL-MCNC: 116 MG/DL (ref 65–100)
POTASSIUM SERPL-SCNC: 3.4 MMOL/L (ref 3.5–5.1)
PROT SERPL-MCNC: 6.2 G/DL (ref 6.4–8.2)
SERVICE CMNT-IMP: ABNORMAL
SERVICE CMNT-IMP: NORMAL
SODIUM SERPL-SCNC: 142 MMOL/L (ref 136–145)

## 2021-01-05 PROCEDURE — 74011250637 HC RX REV CODE- 250/637: Performed by: INTERNAL MEDICINE

## 2021-01-05 PROCEDURE — 99233 SBSQ HOSP IP/OBS HIGH 50: CPT | Performed by: CLINICAL NURSE SPECIALIST

## 2021-01-05 PROCEDURE — 74011000258 HC RX REV CODE- 258: Performed by: INTERNAL MEDICINE

## 2021-01-05 PROCEDURE — 80053 COMPREHEN METABOLIC PANEL: CPT

## 2021-01-05 PROCEDURE — 85384 FIBRINOGEN ACTIVITY: CPT

## 2021-01-05 PROCEDURE — 36415 COLL VENOUS BLD VENIPUNCTURE: CPT

## 2021-01-05 PROCEDURE — 74011000250 HC RX REV CODE- 250: Performed by: INTERNAL MEDICINE

## 2021-01-05 PROCEDURE — 65660000000 HC RM CCU STEPDOWN

## 2021-01-05 PROCEDURE — 74011250636 HC RX REV CODE- 250/636: Performed by: INTERNAL MEDICINE

## 2021-01-05 PROCEDURE — 94640 AIRWAY INHALATION TREATMENT: CPT

## 2021-01-05 PROCEDURE — 77010033678 HC OXYGEN DAILY

## 2021-01-05 PROCEDURE — 82962 GLUCOSE BLOOD TEST: CPT

## 2021-01-05 PROCEDURE — 85379 FIBRIN DEGRADATION QUANT: CPT

## 2021-01-05 PROCEDURE — 82728 ASSAY OF FERRITIN: CPT

## 2021-01-05 RX ORDER — POTASSIUM CHLORIDE 20 MEQ/1
40 TABLET, EXTENDED RELEASE ORAL
Status: DISCONTINUED | OUTPATIENT
Start: 2021-01-05 | End: 2021-01-05

## 2021-01-05 RX ADMIN — GUAIFENESIN AND DEXTROMETHORPHAN 5 ML: 100; 10 SYRUP ORAL at 18:05

## 2021-01-05 RX ADMIN — LEVOFLOXACIN 750 MG: 5 INJECTION, SOLUTION INTRAVENOUS at 18:58

## 2021-01-05 RX ADMIN — DILTIAZEM HYDROCHLORIDE 120 MG: 120 CAPSULE, COATED, EXTENDED RELEASE ORAL at 09:29

## 2021-01-05 RX ADMIN — Medication 10 ML: at 05:54

## 2021-01-05 RX ADMIN — REMDESIVIR 100 MG: 100 INJECTION, POWDER, LYOPHILIZED, FOR SOLUTION INTRAVENOUS at 21:11

## 2021-01-05 RX ADMIN — CYCLOBENZAPRINE 10 MG: 10 TABLET, FILM COATED ORAL at 21:12

## 2021-01-05 RX ADMIN — GUAIFENESIN AND DEXTROMETHORPHAN 5 ML: 100; 10 SYRUP ORAL at 09:30

## 2021-01-05 RX ADMIN — PANTOPRAZOLE SODIUM 40 MG: 40 TABLET, DELAYED RELEASE ORAL at 09:29

## 2021-01-05 RX ADMIN — GABAPENTIN 300 MG: 300 CAPSULE ORAL at 21:12

## 2021-01-05 RX ADMIN — ATORVASTATIN CALCIUM 10 MG: 10 TABLET, FILM COATED ORAL at 21:12

## 2021-01-05 RX ADMIN — ALBUTEROL SULFATE 2 PUFF: 90 AEROSOL, METERED RESPIRATORY (INHALATION) at 20:00

## 2021-01-05 RX ADMIN — ZINC SULFATE 220 MG (50 MG) CAPSULE 1 CAPSULE: CAPSULE at 09:29

## 2021-01-05 RX ADMIN — DEXAMETHASONE SODIUM PHOSPHATE 6 MG: 4 INJECTION, SOLUTION INTRAMUSCULAR; INTRAVENOUS at 09:29

## 2021-01-05 RX ADMIN — ENOXAPARIN SODIUM 40 MG: 40 INJECTION SUBCUTANEOUS at 09:30

## 2021-01-05 RX ADMIN — Medication 10 ML: at 21:12

## 2021-01-05 RX ADMIN — ENOXAPARIN SODIUM 40 MG: 40 INJECTION SUBCUTANEOUS at 21:11

## 2021-01-05 NOTE — PROGRESS NOTES
End of Shift Note    Bedside shift change report given to Elizabeth (oncoming nurse) by Rosa Spence (offgoing nurse). Report included the following information SBAR and Kardex    Shift worked:  [id number]     Shift summary and any significant changes:     0300 Attempted to ween patient off of O2 this morning. Patient stated that she felt short of breath and O2 sat dropped and remained 86-88. Put patient back on 1L NC.    0640 Patient blood sugar 65. She is drinking orange juice. Will recheck. Concerns for physician to address:   Discharge plans     Zone phone for [place] shift:   [phone]       Activity:  Activity Level: Up with Assistance  Number times ambulated in hallways past shift: 0  Number of times OOB to chair past shift: 0    Cardiac:   Cardiac Monitoring: Yes      Cardiac Rhythm: Normal sinus rhythm    Access:   Current line(s): PIV     Genitourinary:   Urinary status: voiding    Respiratory:   O2 Device: Nasal cannula  Chronic home O2 use?: NO  Incentive spirometer at bedside: YES     GI:  Last Bowel Movement Date: 01/04/21  Current diet:  DIET DIABETIC CONSISTENT CARB Regular  DIET NUTRITIONAL SUPPLEMENTS All Meals; Glucerna Shake  Passing flatus: YES  Tolerating current diet: YES  % Diet Eaten: 50 %    Pain Management:   Patient states pain is manageable on current regimen: YES    Skin:  Mc Score: 21  Interventions: increase time out of bed and PT/OT consult    Patient Safety:  Fall Score:  Total Score: 4  Interventions: bed/chair alarm, gripper socks, pt to call before getting OOB and stay with me (per policy)  High Fall Risk: Yes    Length of Stay:  Expected LOS: - - -  Actual LOS: ZOILA/Mario Multani 0825

## 2021-01-05 NOTE — DIABETES MGMT
[address] [facility]    CLINICAL NURSE SPECIALIST CONSULT NOTE  Follow-up note    Presentation   Lupe Cabrales is a 41 y.o. female admitted 1/1/21 with SOB and COVID +. reported that she was diagnosed with COVID 3 days prior to coming to the ED, after a test at her PCP. Began having symptoms on 12/24 with body aches, fever, SOB, fatigue, and cough.  also has COVID-19. CXR:   Diffuse bilateral patchy airspace disease is compatible with COVID   pneumonia. HX:   Past Medical History:   Diagnosis Date    Diabetes ([facility]     Migraine     RA [facility]rheumatoid [facility]) ([facility])      DX: COVID-19 PNA. Acute respiratory failure with hypoxia. History of RA (on enbrel). Diabetes. TX: patient own insulin pump. Vit C. Statin. Steroid. Neurotin. IV ABX. Gi prophylaxis. Remdesivir. Zinc. Clot prevention. resp meds. 1/5/21 PRN xanax added for anxiety yesterday evening. titrating oxygen. May be able to discharge dependent upon O2 use and if a oxygen challenge is needed. Had a BG of 65 at 0630 this morning and drank some OJ, upset stomach and patient has been unable to eat today. Diabetes: Patient has known Type 1 diabetes, treated with insulin PTA. Family history positive for diabetes in multiple family members. Admission  and A1c unavailable indicates variable diabetes control. Patient was diagnosed with diabetes about 35 years ago and has been on an insulin pump for the last 8 years. Dr. Gildardo Knox, her PCP manages her diabetes.    Consulted by Provider for advanced diabetes nursing assessment and care, specifically related to    [x] Inpatient management strategy    Diabetes-related medical history  Neurological complications  Gastroparesis and Peripheral neuropathy  Microvascular disease  Retinopathy    Diabetes medication history  Drug class Currently in use Discontinued Never used   Biguanide      DDP-4 inhibitor       Sulfonylurea      Thiazolidinedione      GLP-1 RA      SGLT-2 inhibitors Farxiga 5mg daily (started abuot 3 months ago)     Basal insulin Insulin pump (see settings below)     Bolus insulin      Fixed Dose  Combinations        Subjective   I drank some OJ this morning and that has messed up my stomach for the day.     Patient reports the following home diabetes self-care practices:  Eating pattern: because of gastroparesis she eats only when she feels hungry or her BG is low. Has trouble eating because then her stomach feels too full. [x] Lunch  Raw veggies and maybe a glucerna shake  [x] Dinner  Small protein, beans  Physical activity pattern: lives at home with her . Not very active since COVID. Monitoring pattern: checks BG three times a day and states that it can be anywhere from 180-230s here recently since Juan. Never sees a BG lower than 90s. Taking medications pattern  [x] Consistent administration  [x] Affordable    Objective   Physical exam  General Alert, oriented but ill-appearing. Conversant and cooperative. Vital Signs On RA trialing no oxygen. Normotensive. Afebrile. NSR. Visit Vitals  BP (!) 140/80 (BP 1 Location: Left arm, BP Patient Position: At rest)   Pulse 78   Temp 98 °F (36.7 °C)   Resp 18   Ht 5' 1\" (1.549 m)   Wt 98.7 kg (217 lb 8 oz)   SpO2 94%   BMI 41.10 kg/m²      Laboratory  Tests 1/5 1/4 1/3 1/2 1/1   A1c         209 203 208 167   Anion gap 6 5 8 11 25   Serum triglycerides        WBC  14.2 13.1 6.7 7.5   Serum creatinine 0.69 0.84 0.69 0.72 0.87   GFR >60 >60 >60 >60 >60   AST 24  33  38   ALT 26  23  23   Procal   0.17  0.19   ferritin 254   371 391   CRP     17.70     Factors affecting BG management  Factor Dose Comments   Nutrition:  Carb-controlled meals  With glucerna shakes   60 grams/meal   Note able to eat today since she drank OJ earlier this morning. Did drink a glucerna for lunch.       Drugs:  Steroids  Other: Vitamin C   Decadron 6mg Daily     First dose 1/2/21  Can affect POC glucose   Pain PRN percocet available Patient states no pain   Infection: COVID-19 PNA  Afebrile. WBC 14.2 (1/4/21)     Blood glucose pattern        Assessment and Plan   Nursing Diagnosis Risk for unstable blood glucose pattern   Nursing Intervention Domain 5254 Decision-making Support   Nursing Interventions Examined current inpatient diabetes control   Explored factors facilitating and impeding inpatient management  Identified self-management practices impeding diabetes control  Explored corrective strategies with patient and responsible inpatient provider   Informed patient of rational for insulin strategy while hospitalized  Instructed patient in how steroids can impact BG     Evaluation   This woman, with Type 1 diabetes, did not achieve diabetes control prior to admission (PTA), as evidenced by admission BG of 167 and A1c unavailable. Based on assessment of diabetes self-care practices, interventions that warrant action while hospitalized include:   [x] Healthy Eating   [x] Problem Solving  [x] Being Active   [x] Healthy Coping  [x] Monitoring   [x] Reducing Risks  [x] Taking Medications  The patient would also benefit from diabetes self-management education and support [facility]) after discharge. During this hospitalization, the patient has not achieved inpatient blood glucose target of 100-180mg/dl. On admission she was given 10 mg of decadron IV and 40 mg of methylprednisolone before being started on a a scheduled daily dose of steroid. BG range has been in the 200s over the last 48 hours. She has been using her own insulin pump (670G Medtronic pump) and her settings are as follows:  Carb ratio 1:5  Sensitivity 40  Target range   Active insulin time 3hrs  Bolus increment 0.1units  Max basal is 2.0 units  Max bolus is 25 units   Auto suspend is 12hrs. Confirmed today when speaking with patient that her insulin pump is operating in auto mode.  She has been giving units per her carbohydrate intake and also just correcting throughout the day per her pump after steroid is given. POC BG this AM at 0630 of 65 and her pump also stated this number, drank OJ. However patient has not tolerated oral intake since then related to her gastroparesis. BG range otherwise 90-120s. Also stated that her  is in the ER with [id number]-95 related complications and her father is at [facility] also with COVID. These personal occurrences can be stressors which can also affect BG. She was able to tell me that her pump is in auto mode for 16.3 hours at 69% and she is within targeted range 15.40 hours at 65%. Factors that have played a role include:  [x] Critical nature of illness state  [x] Glucocorticoid use    Recommendations   Recommend:    Continue to allow patient to use her own insulin pump and use corrective bolus insulin through this to override the impact of steroids. Order A1C    Continue POC glucose checks    [x] Referral to  [x] Diabetes Self-Management Training through [facility] (Phone [phone] to schedule appointment)    Billing Code(s)   I personally reviewed medical record, including notes, data and current medications, and examined the patient at bedside before making care recommendations.      [x] 24430 IP subsequent hospital care - 35 minutes [] 25177 Prolonged Services - 65 minutes [] 52905 Prolonged Services - 110 minutes  [] 00747 IP subsequent hospital care - 25 minutes [] 74717 Prolonged Services - 55 minutes [] 47752 Prolonged Services - 100 minutes  [] 15438 IP subsequent hospital care - 15 minutes [] 07857 Prolonged Services - 45 minutes [] 07356 Prolonged Services - 90 minutes    Matt Mendes MSN, RN, ACCNS-AG  Matt Mendes, CNS  Diabetes Clinical Nurse Specialist  [facility]  Access via [address] [place] [address]

## 2021-01-05 NOTE — PROGRESS NOTES
Hospitalist Progress Note    NAME: Hung Late   :  1975   MRN:  [id number]       Assessment / Plan:  Acute hypoxic respiratory failure POA  COVID-19 pneumonia POA  - covid swab positive at PCP office   - Admitted with increasing SOB  - Hypoxic, required 13 L high flow after admit  - Weaned to 0.5 liters when I saw, I stopped the oxygen, maintaining sats in low 90s           however still SOB walking to the bedside commode  - complete dexamethaxone and remdesivir, last dose today  - O2 challenge  - appreciate pulm consult  - zinc and vitamin C  - s/p empiric levaquin  - start discharge planning  CTA chest:  No acute pulmonary embolism. Marked patchy bilateral lung opacities in keeping with atypical/viral  infection. Hepatic steatosis. Anxiety/depression  -patient anxious/tearful  -xanax  prn    DM1  -has insulin pump  -infuses novolin, usually about 75 units daily  -given steroids Tx, will likely need additional insulin dosing  - BS dropped to 65 today, stop lantus  -SSI with POCs    RA  -on enbreal, last dose   -previously on methotrexate     HTN  -cont CCB    HLD  -cont statin        Code Status: Full code  Surrogate Decision Maker: Spouse     DVT Prophylaxis: Lovenox  GI Prophylaxis: not indicated     Baseline: Ambulatory     PPx[de-identified] lovenox     Subjective:     Chief Complaint / Reason for Physician Visit f/u COVID pneumonia  \" I feel SOB after using the bedside commode\"  SOB when I saw, just walked a few feet to bedside commode  O2 weaned to 0.5 L NC, sats > 90%  No other complaints  Discussed with RN events overnight.      Review of Systems:  Symptom Y/N Comments  Symptom Y/N Comments   Fever/Chills n   Chest Pain n    Poor Appetite    Edema     Cough y   Abdominal Pain n    Sputum n   Joint Pain     SOB/POWERS y   Pruritis/Rash     Nausea/vomit n   Tolerating PT/OT     Diarrhea n   Tolerating Diet y    Constipation n   Other       Could NOT obtain due to:      Objective:     VITALS: Last 24hrs VS reviewed since prior progress note. Most recent are:  Patient Vitals for the past 24 hrs:   Temp Pulse Resp BP SpO2   01/05/21 1443 98 °F (36.7 °C) 78 18 (!) 140/80 94 %   01/05/21 1210 97.5 °F (36.4 °C) 68 18 131/80 92 %   01/05/21 0748 98 °F (36.7 °C) 76 18 111/76 95 %   01/05/21 0251 98.1 °F (36.7 °C) 69 18 123/67 92 %   01/04/21 2228 98.7 °F (37.1 °C) 72 18 136/72 94 %   01/04/21 1835 98.7 °F (37.1 °C) 88 18 120/76 95 %       Intake/Output Summary (Last 24 hours) at 1/5/2021 1500  Last data filed at 1/5/2021 0251  Gross per 24 hour   Intake 100 ml   Output    Net 100 ml        I had a face to face encounter and independently examined this patient on 1/5/2021, as outlined below:    PHYSICAL EXAM:  General: WD, WN. Alert, cooperative, no acute distress    EENT:  Anicteric sclerae. MMM  Resp:  Few rhonchi, no rales/wheezing. No accessory muscle use  CV:  Regular  rhythm,  No edema  GI:  Soft, Non distended, Non tender. +Bowel sounds  Neurologic:  Alert and oriented X 3, normal speech,   Psych:   Good insight. Not anxious nor agitated  Skin:  No rashes. No jaundice    Reviewed most current lab test results and cultures  YES  Reviewed most current radiology test results   YES  Review and summation of old records today    NO  Reviewed patient's current orders and MAR    YES  PMH/SH reviewed - no change compared to H&P  ________________________________________________________________________  Care Plan discussed with:    Comments   Patient x    Family      RN x    Care Manager     Consultant                        Multidiciplinary team rounds were held today with , nursing, pharmacist and clinical coordinator. Patient's plan of care was discussed; medications were reviewed and discharge planning was addressed.      ________________________________________________________________________        Comments   >50% of visit spent in counseling and coordination of care ________________________________________________________________________  Licha Toney MD     Procedures: see electronic medical records for all procedures/Xrays and details which were not copied into this note but were reviewed prior to creation of Plan. LABS:  I reviewed today's most current labs and imaging studies.   Pertinent labs include:  Recent Labs     01/04/21  0900 01/03/21  0254   WBC 14.2* 13.1*   HGB 14.1 12.8   HCT 43.4 39.9   * 363     Recent Labs     01/05/21  0309 01/04/21  1412 01/03/21  0254    139 138   K 3.4* 3.9 3.5   * 107 105   CO2 25 27 25   * 209* 203*   BUN 20 22* 18   CREA 0.69 0.84 0.69   CA 8.1* 8.9 8.6   ALB 2.3*  --  2.3*   TBILI 0.3  --  0.3   ALT 26  --  23       Signed: Licha Toney MD

## 2021-01-05 NOTE — PROGRESS NOTES
[facility] INTERDISCIPLINARY ROUNDS    Cardiopulmonary Care Interdisciplinary Rounds were held today to discuss patient's plan of care and outcomes. The following members were present: NP/Physician, Pharmacy, Nursing and Case Management. PLAN OF CARE:   Continue current treatment plan, plan for oxygen challenge test today to determine need for home O2.      Expected Length of Stay:  5d 12h

## 2021-01-05 NOTE — PROGRESS NOTES
1330  Awaiting oxygen challenge. [facility] 09  Awaiting oxygen challenge documentation to assess home oxygen needs.       Transition of Care Plan:                    -confirm transport at d/c  -assess need for home oxygen//HHC  -make PCP followup-request sent to CM specialist 1/5  -confirm DME at home

## 2021-01-05 NOTE — PROGRESS NOTES
Problem: Falls - Risk of  Goal: *Absence of Falls  Description: Document Navdeep Weems Fall Risk and appropriate interventions in the flowsheet. Outcome: Progressing Towards Goal  Note: Fall Risk Interventions:  Mobility Interventions: Bed/chair exit alarm, Communicate number of staff needed for ambulation/transfer, OT consult for ADLs, Patient to call before getting OOB, PT Consult for mobility concerns, PT Consult for assist device competence         Medication Interventions: Bed/chair exit alarm, Patient to call before getting OOB, Teach patient to arise slowly    Elimination Interventions: Bed/chair exit alarm, Call light in reach, Patient to call for help with toileting needs, Stay With Me (per policy), Toilet paper/wipes in reach, Toileting schedule/hourly rounds    History of Falls Interventions: Bed/chair exit alarm, Investigate reason for fall, Room close to nurse's station         Problem: Patient Education: Go to Patient Education Activity  Goal: Patient/Family Education  Outcome: Progressing Towards Goal     Problem: Breathing Pattern - Ineffective  Goal: *Absence of hypoxia  Outcome: Progressing Towards Goal  Goal: *Use of effective breathing techniques  Outcome: Progressing Towards Goal     Problem: Airway Clearance - Ineffective  Goal: Achieve or maintain patent airway  Outcome: Progressing Towards Goal     Problem: Gas Exchange - Impaired  Goal: Absence of hypoxia  Outcome: Progressing Towards Goal  Goal: Promote optimal lung function  Outcome: Progressing Towards Goal     Problem: Breathing Pattern - Ineffective  Goal: Ability to achieve and maintain a regular respiratory rate  Outcome: Progressing Towards Goal     Problem:  Body Temperature -  Risk of, Imbalanced  Goal: Ability to maintain a body temperature within defined limits  Outcome: Progressing Towards Goal  Goal: Will regain or maintain usual level of consciousness  Outcome: Progressing Towards Goal  Goal: Complications related to the disease process, condition or treatment will be avoided or minimized  Outcome: Progressing Towards Goal     Problem: Isolation Precautions - Risk of Spread of Infection  Goal: Prevent transmission of infectious organism to others  Outcome: Progressing Towards Goal     Problem: Nutrition Deficits  Goal: Optimize nutrtional status  Outcome: Progressing Towards Goal     Problem: Loneliness or Risk for Loneliness  Goal: Demonstrate positive use of time alone when socialization is not possible  Outcome: Progressing Towards Goal     Problem: Fatigue  Goal: Verbalize increase energy and improved vitality  Outcome: Progressing Towards Goal

## 2021-01-06 LAB
ALBUMIN SERPL-MCNC: 2.2 G/DL (ref 3.5–5)
ALBUMIN/GLOB SERPL: 0.6 {RATIO} (ref 1.1–2.2)
ALP SERPL-CCNC: 64 U/L (ref 45–117)
ALT SERPL-CCNC: 28 U/L (ref 12–78)
ANION GAP SERPL CALC-SCNC: 5 MMOL/L (ref 5–15)
AST SERPL-CCNC: 19 U/L (ref 15–37)
BACTERIA SPEC CULT: NORMAL
BASOPHILS # BLD: 0 K/UL (ref 0–0.1)
BASOPHILS NFR BLD: 0 % (ref 0–1)
BILIRUB SERPL-MCNC: 0.9 MG/DL (ref 0.2–1)
BUN SERPL-MCNC: 18 MG/DL (ref 6–20)
BUN/CREAT SERPL: 24 (ref 12–20)
CALCIUM SERPL-MCNC: 7.9 MG/DL (ref 8.5–10.1)
CHLORIDE SERPL-SCNC: 107 MMOL/L (ref 97–108)
CO2 SERPL-SCNC: 25 MMOL/L (ref 21–32)
CREAT SERPL-MCNC: 0.74 MG/DL (ref 0.55–1.02)
D DIMER PPP FEU-MCNC: 0.39 MG/L FEU (ref 0–0.65)
DIFFERENTIAL METHOD BLD: NORMAL
EOSINOPHIL # BLD: 0 K/UL (ref 0–0.4)
EOSINOPHIL NFR BLD: 0 % (ref 0–7)
ERYTHROCYTE [DISTWIDTH] IN BLOOD BY AUTOMATED COUNT: 13.3 % (ref 11.5–14.5)
FERRITIN SERPL-MCNC: 226 NG/ML (ref 26–388)
FIBRINOGEN PPP-MCNC: 402 MG/DL (ref 200–475)
GLOBULIN SER CALC-MCNC: 3.7 G/DL (ref 2–4)
GLUCOSE BLD STRIP.AUTO-MCNC: 114 MG/DL (ref 65–100)
GLUCOSE BLD STRIP.AUTO-MCNC: 139 MG/DL (ref 65–100)
GLUCOSE BLD STRIP.AUTO-MCNC: 251 MG/DL (ref 65–100)
GLUCOSE BLD STRIP.AUTO-MCNC: 347 MG/DL (ref 65–100)
GLUCOSE SERPL-MCNC: 176 MG/DL (ref 65–100)
HCT VFR BLD AUTO: 38.9 % (ref 35–47)
HGB BLD-MCNC: 12.5 G/DL (ref 11.5–16)
IMM GRANULOCYTES # BLD AUTO: 0 K/UL (ref 0–0.04)
IMM GRANULOCYTES NFR BLD AUTO: 0 % (ref 0–0.5)
LYMPHOCYTES # BLD: 1.6 K/UL (ref 0.8–3.5)
LYMPHOCYTES NFR BLD: 17 % (ref 12–49)
MCH RBC QN AUTO: 28 PG (ref 26–34)
MCHC RBC AUTO-ENTMCNC: 32.1 G/DL (ref 30–36.5)
MCV RBC AUTO: 87 FL (ref 80–99)
METAMYELOCYTES NFR BLD MANUAL: 2 %
MONOCYTES # BLD: 0.7 K/UL (ref 0–1)
MONOCYTES NFR BLD: 7 % (ref 5–13)
NEUTS SEG # BLD: 7.1 K/UL (ref 1.8–8)
NEUTS SEG NFR BLD: 74 % (ref 32–75)
NRBC # BLD: 0 K/UL (ref 0–0.01)
NRBC BLD-RTO: 0 PER 100 WBC
PLATELET # BLD AUTO: 333 K/UL (ref 150–400)
PMV BLD AUTO: 9.3 FL (ref 8.9–12.9)
POTASSIUM SERPL-SCNC: 3.2 MMOL/L (ref 3.5–5.1)
PROT SERPL-MCNC: 5.9 G/DL (ref 6.4–8.2)
RBC # BLD AUTO: 4.47 M/UL (ref 3.8–5.2)
RBC MORPH BLD: NORMAL
SERVICE CMNT-IMP: ABNORMAL
SERVICE CMNT-IMP: NORMAL
SODIUM SERPL-SCNC: 137 MMOL/L (ref 136–145)
WBC # BLD AUTO: 9.6 K/UL (ref 3.6–11)

## 2021-01-06 PROCEDURE — 99233 SBSQ HOSP IP/OBS HIGH 50: CPT | Performed by: CLINICAL NURSE SPECIALIST

## 2021-01-06 PROCEDURE — 36415 COLL VENOUS BLD VENIPUNCTURE: CPT

## 2021-01-06 PROCEDURE — 74011250637 HC RX REV CODE- 250/637: Performed by: INTERNAL MEDICINE

## 2021-01-06 PROCEDURE — 85025 COMPLETE CBC W/AUTO DIFF WBC: CPT

## 2021-01-06 PROCEDURE — 80053 COMPREHEN METABOLIC PANEL: CPT

## 2021-01-06 PROCEDURE — 82962 GLUCOSE BLOOD TEST: CPT

## 2021-01-06 PROCEDURE — 65660000000 HC RM CCU STEPDOWN

## 2021-01-06 PROCEDURE — 94640 AIRWAY INHALATION TREATMENT: CPT

## 2021-01-06 PROCEDURE — 82728 ASSAY OF FERRITIN: CPT

## 2021-01-06 PROCEDURE — 85384 FIBRINOGEN ACTIVITY: CPT

## 2021-01-06 PROCEDURE — 97161 PT EVAL LOW COMPLEX 20 MIN: CPT

## 2021-01-06 PROCEDURE — 97116 GAIT TRAINING THERAPY: CPT

## 2021-01-06 PROCEDURE — 85379 FIBRIN DEGRADATION QUANT: CPT

## 2021-01-06 PROCEDURE — 74011250636 HC RX REV CODE- 250/636: Performed by: INTERNAL MEDICINE

## 2021-01-06 RX ADMIN — GABAPENTIN 300 MG: 300 CAPSULE ORAL at 22:25

## 2021-01-06 RX ADMIN — DILTIAZEM HYDROCHLORIDE 120 MG: 120 CAPSULE, COATED, EXTENDED RELEASE ORAL at 09:50

## 2021-01-06 RX ADMIN — GUAIFENESIN AND DEXTROMETHORPHAN 5 ML: 100; 10 SYRUP ORAL at 19:00

## 2021-01-06 RX ADMIN — CYCLOBENZAPRINE 10 MG: 10 TABLET, FILM COATED ORAL at 22:25

## 2021-01-06 RX ADMIN — PANTOPRAZOLE SODIUM 40 MG: 40 TABLET, DELAYED RELEASE ORAL at 09:50

## 2021-01-06 RX ADMIN — ALBUTEROL SULFATE 2 PUFF: 90 AEROSOL, METERED RESPIRATORY (INHALATION) at 13:05

## 2021-01-06 RX ADMIN — ENOXAPARIN SODIUM 40 MG: 40 INJECTION SUBCUTANEOUS at 22:25

## 2021-01-06 RX ADMIN — OXYCODONE HYDROCHLORIDE AND ACETAMINOPHEN 1000 MG: 500 TABLET ORAL at 09:49

## 2021-01-06 RX ADMIN — DEXAMETHASONE SODIUM PHOSPHATE 6 MG: 4 INJECTION, SOLUTION INTRAMUSCULAR; INTRAVENOUS at 09:56

## 2021-01-06 RX ADMIN — ALBUTEROL SULFATE 2 PUFF: 90 AEROSOL, METERED RESPIRATORY (INHALATION) at 01:56

## 2021-01-06 RX ADMIN — GUAIFENESIN AND DEXTROMETHORPHAN 5 ML: 100; 10 SYRUP ORAL at 09:57

## 2021-01-06 RX ADMIN — Medication 10 ML: at 22:29

## 2021-01-06 RX ADMIN — Medication 10 ML: at 05:08

## 2021-01-06 RX ADMIN — ZINC SULFATE 220 MG (50 MG) CAPSULE 1 CAPSULE: CAPSULE at 09:50

## 2021-01-06 RX ADMIN — ALBUTEROL SULFATE 2 PUFF: 90 AEROSOL, METERED RESPIRATORY (INHALATION) at 07:35

## 2021-01-06 RX ADMIN — ALBUTEROL SULFATE 2 PUFF: 90 AEROSOL, METERED RESPIRATORY (INHALATION) at 19:43

## 2021-01-06 RX ADMIN — ENOXAPARIN SODIUM 40 MG: 40 INJECTION SUBCUTANEOUS at 09:56

## 2021-01-06 RX ADMIN — ATORVASTATIN CALCIUM 10 MG: 10 TABLET, FILM COATED ORAL at 22:25

## 2021-01-06 NOTE — DIABETES MGMT
[address] [facility]    CLINICAL NURSE SPECIALIST CONSULT NOTE  Follow-up note    Presentation   Trinity Caballero is a 41 y.o. female admitted 1/1/21 with SOB and COVID +. reported that she was diagnosed with COVID 3 days prior to coming to the ED, after a test at her PCP. Began having symptoms on 12/24 with body aches, fever, SOB, fatigue, and cough.  also has COVID-19. CXR:   Diffuse bilateral patchy airspace disease is compatible with COVID   pneumonia. HX:   Past Medical History:   Diagnosis Date    Diabetes ([address]     Migraine     RA (rheumatoid arthritis) ([facility])      DX: COVID-19 PNA. Acute respiratory failure with hypoxia. History of RA (on enbrel). Diabetes. TX: patient's own insulin pump. Vit C. Statin. Steroid. Neurotin. Gi prophylaxis. Remdesivir. Zinc. Clot prevention. resp meds. 1/5/21 PRN xanax added for anxiety yesterday evening. titrating oxygen. May be able to discharge dependent upon O2 use and if a oxygen challenge is needed. Had a BG of 65 at 0630 this morning and drank some OJ, upset stomach and patient has been unable to eat today. 1/6/21 required 2L NC of O2 when ambulating to the [facility]. Hesitant to be dishcarged home due to multiple family members being hospitalized with COVID including her , so there is no one at home. Home oxygen test complete. Diabetes: Patient has known Type 1 diabetes, treated with insulin PTA. Family history positive for diabetes in multiple family members. Admission  and A1c unavailable indicates variable diabetes control. Patient was diagnosed with diabetes about 35 years ago and has been on an insulin pump for the last 8 years. Dr. Sy Warren, her PCP manages her diabetes.    Consulted by Provider for advanced diabetes nursing assessment and care, specifically related to    [x] Inpatient management strategy    Diabetes-related medical history  Neurological complications  Gastroparesis and Peripheral neuropathy  Microvascular disease  Retinopathy    Diabetes medication history  Drug class Currently in use Discontinued Never used   Biguanide      DDP-4 inhibitor       Sulfonylurea      Thiazolidinedione      GLP-1 RA      SGLT-2 inhibitors Farxiga 5mg daily (started abuot 3 months ago)     Basal insulin Insulin pump (see settings below)     Bolus insulin      Fixed Dose  Combinations        Subjective   \"Everything I have eaten today just goes straight through me.     Patient reports the following home diabetes self-care practices:  Eating pattern: because of gastroparesis she eats only when she feels hungry or her BG is low. Has trouble eating because then her stomach feels too full. [x] Lunch  Raw veggies and maybe a glucerna shake  [x] Dinner  Small protein, beans  Physical activity pattern: lives at home with her . Not very active since COVID. Monitoring pattern: checks BG three times a day and states that it can be anywhere from 180-230s here recently since Juan. Never sees a BG lower than 90s. Taking medications pattern  [x] Consistent administration  [x] Affordable    Objective   Physical exam  General Alert, oriented but ill-appearing. Conversant and cooperative. Vital Signs On RA, 2L NC sometimes when up to [facility]. Normotensive. Afebrile. NSR. Visit Vitals  /72   Pulse 95   Temp 98.2 °F (36.8 °C)   Resp 22   Ht 5' 1\" (1.549 m)   Wt 98.7 kg (217 lb 8 oz)   SpO2 93%   BMI 41.10 kg/m²      Laboratory  Tests 1/6 1/5 1/4 1/3 1/2 1/1   A1c          116 209 203 208 167   Anion gap 5 6 5 8 11 25   Serum triglycerides         WBC 9.6  14.2 13.1 6.7 7.5   Serum creatinine 0.74 0.69 0.84 0.69 0.72 0.87   GFR >60 >60 >60 >60 >60 >60   AST 19 24  33  38   ALT 28 26  23  23   Procal    0.17  0.19   ferritin  254   371 391   CRP      17.70   D-dimer : 0.39.  fibrinogen 402    Factors affecting BG management  Factor Dose Comments   Nutrition:  Carb-controlled meals  With glucerna shakes   60 grams/meal   Had a glucerna for breakfast and pork with gravy, apple crisps, and milk for lunch. Drugs:  Steroids  Other: Vitamin C   Decadron 6mg Daily     First dose 1/2/21  Can affect POC glucose   Pain PRN percocet available Patient states no pain. Although chest discomfort with coughing    Infection: COVID-19 PNA  Afebrile. WBC 14.2 (1/4/21)==> down to 9.6 today     Blood glucose pattern        Assessment and Plan   Nursing Diagnosis Risk for unstable blood glucose pattern   Nursing Intervention Domain 5255 Decision-making Support   Nursing Interventions Examined current inpatient diabetes control   Explored factors facilitating and impeding inpatient management  Identified self-management practices impeding diabetes control  Explored corrective strategies with patient and responsible inpatient provider      Evaluation   This woman, with Type 1 diabetes, did not achieve diabetes control prior to admission (PTA), as evidenced by admission BG of 167 and A1c unavailable. Based on assessment of diabetes self-care practices, interventions that warrant action while hospitalized include:   [x] Healthy Eating   [x] Problem Solving  [x] Being Active   [x] Healthy Coping  [x] Monitoring   [x] Reducing Risks  [x] Taking Medications  The patient would also benefit from diabetes self-management education and support [facility]) after discharge. During this hospitalization, the patient has not achieved inpatient blood glucose target of 100-180mg/dl. On admission she was given 10 mg of decadron IV and 40 mg of methylprednisolone before being started on a a scheduled daily dose of steroid. BG range has been in the 200s over the last 48 hours. She has been using her own insulin pump (670G Medtronic pump) and her settings are as follows:  Carb ratio 1:5  Sensitivity 40  Target range   Active insulin time 3hrs  Bolus increment 0.1units  Max basal is 2.0 units  Max bolus is 25 units   Auto suspend is 12hrs.      Her insulin pump is operating in auto mode. She has been giving units per her carbohydrate intake and also just correcting throughout the day per her pump after steroid is given. BG range 110-170s. Her  is admitted to this hospital with ULEQS-87 related complications, her father is at [facility] also with COVID, and her step mother is also COVID positive being transferred to [facility]. These personal occurrences can be stressors which can also affect her BG. Her BG continues to rise in the afternoon in the peak effect of the steroid given daily in the morning however her insulin pump is able to correct and keep BG in a good range. Would continue to allow patient to use her own pump. Factors that have played a role include:  [x] Critical nature of illness state  [x] Glucocorticoid use    Recommendations   Recommend:    Continue to allow patient to use her own insulin pump and use corrective bolus insulin through this to override the impact of steroids. Order A1C    Continue POC glucose checks    [x] Referral to  [x] Diabetes Self-Management Training through [facility] (Phone [phone] to schedule appointment)    Billing Code(s)   I personally reviewed medical record, including notes, data and current medications, and examined the patient at bedside before making care recommendations.      [x] [id number] IP subsequent hospital care - 35 minutes [] [id number] Prolonged Services - 65 minutes [] 75671 Prolonged Services - 110 minutes  [] 22561 IP subsequent hospital care - 25 minutes [] 39249 Prolonged Services - 55 minutes [] 20865 Prolonged Services - 100 minutes  [] 93116 IP subsequent hospital care - 15 minutes [] 50622 Prolonged Services - 45 minutes [] 91610 Prolonged Services - 90 minutes    Maximus Mendosa MSN, RN, ACCNS-AG  Maximus Mendosa, CNS  Diabetes Clinical Nurse Specialist  [facility]  Access via [address] [place] [address]

## 2021-01-06 NOTE — PROGRESS NOTES
Problem: Falls - Risk of  Goal: *Absence of Falls  Description: Document Kemi Thomas Fall Risk and appropriate interventions in the flowsheet. Outcome: Progressing Towards Goal  Note: Fall Risk Interventions:  Mobility Interventions: Bed/chair exit alarm, Communicate number of staff needed for ambulation/transfer, OT consult for ADLs, Patient to call before getting OOB, PT Consult for mobility concerns, PT Consult for assist device competence         Medication Interventions: Bed/chair exit alarm, Patient to call before getting OOB, Teach patient to arise slowly    Elimination Interventions: Bed/chair exit alarm, Call light in reach, Patient to call for help with toileting needs, Stay With Me (per policy), Toilet paper/wipes in reach, Toileting schedule/hourly rounds    History of Falls Interventions: Bed/chair exit alarm, Investigate reason for fall, Room close to nurse's station         Problem: Breathing Pattern - Ineffective  Goal: *Absence of hypoxia  Outcome: Progressing Towards Goal     Problem: Airway Clearance - Ineffective  Goal: Achieve or maintain patent airway  Outcome: Progressing Towards Goal     Problem: Gas Exchange - Impaired  Goal: Absence of hypoxia  Outcome: Progressing Towards Goal  Goal: Promote optimal lung function  Outcome: Progressing Towards Goal     Problem: Breathing Pattern - Ineffective  Goal: Ability to achieve and maintain a regular respiratory rate  Outcome: Progressing Towards Goal     Problem:  Body Temperature -  Risk of, Imbalanced  Goal: Ability to maintain a body temperature within defined limits  Outcome: Progressing Towards Goal  Goal: Will regain or maintain usual level of consciousness  Outcome: Progressing Towards Goal  Goal: Complications related to the disease process, condition or treatment will be avoided or minimized  Outcome: Progressing Towards Goal     Problem: Isolation Precautions - Risk of Spread of Infection  Goal: Prevent transmission of infectious organism to others  Outcome: Progressing Towards Goal     Problem: Nutrition Deficits  Goal: Optimize nutrtional status  Outcome: Progressing Towards Goal     Problem: Risk for Fluid Volume Deficit  Goal: Maintain normal heart rhythm  Outcome: Progressing Towards Goal  Goal: Maintain absence of muscle cramping  Outcome: Progressing Towards Goal  Goal: Maintain normal serum potassium, sodium, calcium, phosphorus, and pH  Outcome: Progressing Towards Goal     Problem: Loneliness or Risk for Loneliness  Goal: Demonstrate positive use of time alone when socialization is not possible  Outcome: Progressing Towards Goal     Problem: Fatigue  Goal: Verbalize increase energy and improved vitality  Outcome: Progressing Towards Goal

## 2021-01-06 NOTE — INTERDISCIPLINARY ROUNDS
[facility] Cardiopulmonary Care Interdisciplinary Rounds were held today to discuss patient's plan of care and outcomes. The following members were present: NP/Physician, Pharmacy, Nursing and Case Management. PLAN OF CARE:  
Continue current treatment plan Expected Length of Stay:  5d 12h

## 2021-01-06 NOTE — PROGRESS NOTES
Problem: Mobility Impaired (Adult and Pediatric)  Goal: *Acute Goals and Plan of Care (Insert Text)  Description: FUNCTIONAL STATUS PRIOR TO ADMISSION: Patient was independent and active without use of DME. Patient recently started using her rollator between [place] and admission. Fall within the last week. HOME SUPPORT PRIOR TO ADMISSION: The patient lived with her spouse and has parents in [facility]. All are currently admitted d/t active COVID infection. Physical Therapy Goals  Initiated 1/6/2021  1. Patient will move from supine to sit and sit to supine  in bed with modified independence within 7 day(s). 2.  Patient will transfer from bed to chair and chair to bed with modified independence using the least restrictive device within 7 day(s). 3.  Patient will perform sit to stand with modified independence within 7 day(s). 4.  Patient will ambulate with modified independence for 120 feet with the least restrictive device within 7 day(s). 5.  Patient will ascend/descend 4 stairs with 1 handrail(s) with modified independence within 7 day(s). Outcome: Progressing Towards Goal   PHYSICAL THERAPY EVALUATION  Patient: Jc Miranda (41 y.o. female)  Date: 1/6/2021  Primary Diagnosis: Pneumonia due to COVID-19 virus [U07.1, J12.82]        Precautions:        ASSESSMENT  Based on the objective data described below, the patient presents with POWERS and desaturation, tachycardia,  impaired endurance, and decreased balance following admission for COVID-19. She has a hx of RA and reports declining independence in mobility prior to admission. She was received on 2L O2 via NC but required increase to 3L to maintain saturations and aide in recovery with exertion. Her resting HR was ~105 and increased to 135 BPM during standing and ambulation in the room. Utilized a RW for ambulation to improve balance and energy conservation.  She completed 3 trials x 15, 10, and 10 feet respectively with 1 seated rest break d/t fatigue and dyspnea. This patient demonstrates below baseline functional status, reports a hx of falls, and has no home support at present d/t COVID. She would require supplemental O2 for home mobility. Concern for return home d/t low activity tolerance and lack of support at present. She would benefit from a rehab admission vs home with HHPT, O2, and use of her rollator if discharged home. Current Level of Function Impacting Discharge (mobility/balance): fatigues quickly, 3+ L O2 via NC for activity    Other factors to consider for discharge: her spouse and parents are admitted as well     Patient will benefit from skilled therapy intervention to address the above noted impairments. PLAN :  Recommendations and Planned Interventions: bed mobility training, transfer training, gait training, and therapeutic exercises      Frequency/Duration: Patient will be followed by physical therapy:  3 times a week to address goals. Recommendation for discharge: (in order for the patient to meet his/her long term goals)  Physical therapy at least 2 days/week in the home       IF patient discharges home will need the following DME: patient owns DME required for discharge         SUBJECTIVE:   Patient stated I have no one at home right now.     OBJECTIVE DATA SUMMARY:   HISTORY:    Past Medical History:   Diagnosis Date    Diabetes ([facility] [address]     Migraine     RA (rheumatoid arthritis) ([facility] [address]      Past Surgical History:   Procedure Laterality Date    HX GYN      Hysterectomy 2010       Personal factors and/or comorbidities impacting plan of care:     Home Situation  Home Environment: Private residence  # Steps to Enter: 5  Rails to Enter: Yes  One/Two Story Residence: One story  Living Alone: No  Support Systems: Family member(s), Spouse/Significant Other/Partner  Current DME Used/Available at Home: Walker, rollator  Tub or Shower Type: Shower    EXAMINATION/PRESENTATION/DECISION MAKING:   Critical Behavior:  Neurologic State: Alert  Orientation Level: Oriented X4  Cognition: Appropriate decision making, Appropriate for age attention/concentration, Appropriate safety awareness, Follows commands     Hearing:  Auditory  Auditory Impairment: None  Skin:    Edema:   Range Of Motion:  AROM: Generally decreased, functional                       Strength:    Strength: Generally decreased, functional                    Tone & Sensation:   Tone: Normal              Sensation: Impaired(c/o finger numbness)               Coordination:  Coordination: Within functional limits  Vision:      Functional Mobility:  Bed Mobility:     Supine to Sit: Supervision     Scooting: Contact guard assistance  Transfers:  Sit to Stand: Contact guard assistance  Stand to Sit: Contact guard assistance                       Balance:   Sitting: Intact  Standing: Impaired  Standing - Static: Fair  Standing - Dynamic : Fair(with support)  Ambulation/Gait Training:  Distance (ft): 35 Feet (ft)(15, 10, and 10 respectively)  Assistive Device: Gait belt;Walker, rolling  Ambulation - Level of Assistance: Contact guard assistance     Gait Description (WDL): Exceptions to WDL  Gait Abnormalities: Decreased step clearance;Shuffling gait        Base of Support: Widened     Speed/Tomeka: Slow;Shuffled;Pace decreased (<100 feet/min)  Step Length: Left shortened;Right shortened              Physical Therapy Evaluation Charge Determination   History Examination Presentation Decision-Making   MEDIUM  Complexity : 1-2 comorbidities / personal factors will impact the outcome/ POC  MEDIUM Complexity : 3 Standardized tests and measures addressing body structure, function, activity limitation and / or participation in recreation  LOW Complexity : Stable, uncomplicated  LOW Complexity : FOTO score of       Based on the above components, the patient evaluation is determined to be of the following complexity level: LOW     Pain Rating:      Activity Tolerance:   Fair and  desaturates with exertion and requires oxygen    After treatment patient left in no apparent distress:   Sitting in chair and Call bell within reach    COMMUNICATION/EDUCATION:   The patients plan of care was discussed with: Registered nurse. Fall prevention education was provided and the patient/caregiver indicated understanding., Patient/family have participated as able in goal setting and plan of care. , and Patient/family agree to work toward stated goals and plan of care. Encouraged the patient to get up with nursing assist for meals and to ambulate with RW to the bathroom.      Thank you for this referral.  Michael Judd, PT, DPT   Time Calculation: 26 mins

## 2021-01-06 NOTE — PROGRESS NOTES
0700  Received report from Rach, [facility]. Assumed care of patient. Sugars were higher on hospital glucometer than patient pump readings. Informed MD and Diabetes treatment team. Informed just to monitor sugars as patient doses self. 1900  End of Shift Note    Bedside shift change report given to MARY Loyd (oncoming nurse) by Jeanne Norton RN (offgoing nurse). Report included the following information SBAR, Kardex, MAR and Cardiac Rhythm NSR    Shift worked:  7a-7p     Shift summary and any significant changes:     unmatched glucose readings     Concerns for physician to address:  none     Zone phone for oncoming shift:   na       Activity:  Activity Level: Up with Assistance  Number times ambulated in hallways past shift: 0  Number of times OOB to chair past shift: 0    Cardiac:   Cardiac Monitoring: Yes      Cardiac Rhythm: Normal sinus rhythm    Access:   Current line(s): PIV     Genitourinary:   Urinary status: voiding    Respiratory:   O2 Device: Room air  Chronic home O2 use?: NO  Incentive spirometer at bedside: YES     GI:  Last Bowel Movement Date: 01/03/21  Current diet:  DIET DIABETIC CONSISTENT CARB Regular  DIET NUTRITIONAL SUPPLEMENTS All Meals; Glucerna Shake  Passing flatus: YES  Tolerating current diet: YES  % Diet Eaten: 75 %    Pain Management:   Patient states pain is manageable on current regimen: YES    Skin:  Mc Score: 21  Interventions: increase time out of bed    Patient Safety:  Fall Score:  Total Score: 4  Interventions: gripper socks and pt to call before getting OOB  High Fall Risk: Yes    Length of Stay:  Expected LOS: 5d 12h  Actual LOS: Keegan Tinoco RN

## 2021-01-06 NOTE — PROGRESS NOTES
End of Shift Note    Bedside shift change report given to Elizabeth (oncoming nurse) by Abi Laboy (offgoing nurse). Report included the following information SBAR and Kardex    Shift worked:  [id number]     Shift summary and any significant changes:     Patient required 2L NC when getting up to use the bedside commode. Back on room air now. Concerns for physician to address:       Zone phone for oncoming shift:          Activity:  Activity Level: Up with Assistance  Number times ambulated in hallways past shift: 1  Number of times OOB to chair past shift: 1    Cardiac:   Cardiac Monitoring: Yes      Cardiac Rhythm: Normal sinus rhythm    Access:   Current line(s): PIV     Genitourinary:   Urinary status: voiding    Respiratory:   O2 Device: Room air  Chronic home O2 use?: NO  Incentive spirometer at bedside: YES     GI:  Last Bowel Movement Date: 01/03/21  Current diet:  DIET DIABETIC CONSISTENT CARB Regular  DIET NUTRITIONAL SUPPLEMENTS All Meals; Glucerna Shake  Passing flatus: YES  Tolerating current diet: YES  % Diet Eaten: 75 %    Pain Management:   Patient states pain is manageable on current regimen: YES    Skin:  Mc Score: 21  Interventions: increase time out of bed and PT/OT consult    Patient Safety:  Fall Score:  Total Score: 4  Interventions: bed/chair alarm, gripper socks, pt to call before getting OOB and stay with me (per policy)  High Fall Risk: Yes    Length of Stay:  Expected LOS: 5d 12h  Actual LOS: [address] [place]

## 2021-01-06 NOTE — PROGRESS NOTES
Home Oxygen Test  Date of test: 1/6/2020  Time of test: 0800    Sa02 94 % on room air AT REST. Sa02 75 % on room air DURING AMBULATION. Sa02 90 % on 2 Liters DURING AMBULATION. Sa02 95 % on 2 Liters AT REST/AFTER AMBULATION.

## 2021-01-06 NOTE — PROGRESS NOTES
4p  About [phone] [facility] have declined due to insurance. I spoke with pt who was SOB on the phone. She says her father is at [facility] on a vent from covid and her spouse was admitted yesterday to [facility] with covid. Her son is with the fire dept and can't be exposed to her. She says no one can help her at home as they are all \"scared to death of getting covid\". She hopes not to be on oxygen but does get SOB with activity. She will think about who could transport her home and help out. She would like [facility] for nursing, PT, and HHA. FOC complete and orders sent to them She declines SNF. PT consult placed to assess mobility//DME needs    Awaiting oxygen challenge. Chart herminia'd to Anil to assess her insurance.       Transition of Care Plan:                    -confirm transport at d/c  -assess need for home oxygen//HHC  -make PCP followup-done  -confirm DME at home.   Has walker

## 2021-01-07 VITALS
HEART RATE: 73 BPM | RESPIRATION RATE: 16 BRPM | BODY MASS INDEX: 41.07 KG/M2 | DIASTOLIC BLOOD PRESSURE: 60 MMHG | HEIGHT: 61 IN | OXYGEN SATURATION: 94 % | TEMPERATURE: 98.1 F | WEIGHT: 217.5 LBS | SYSTOLIC BLOOD PRESSURE: 119 MMHG

## 2021-01-07 PROCEDURE — 94640 AIRWAY INHALATION TREATMENT: CPT

## 2021-01-07 PROCEDURE — 74011250637 HC RX REV CODE- 250/637: Performed by: INTERNAL MEDICINE

## 2021-01-07 PROCEDURE — 77010033678 HC OXYGEN DAILY

## 2021-01-07 RX ADMIN — ALBUTEROL SULFATE 2 PUFF: 90 AEROSOL, METERED RESPIRATORY (INHALATION) at 01:52

## 2021-01-07 NOTE — PROGRESS NOTES
0700: End of Shift Note    Bedside shift change report given to Filippo Peterson RN (oncoming nurse) by Chuyita Landaverde (offgoing nurse). Report included the following information SBAR, Kardex, Intake/Output, MAR, Recent Results and Cardiac Rhythm NSR    Shift worked:  [id number]     Shift summary and any significant changes:     this morning, pt. Decided she was going home one way or another     Concerns for physician to address:  discharge     Zone phone for oncoming shift:          Activity:  Activity Level: Up with Assistance  Number times ambulated in hallways past shift: 0  Number of times OOB to chair past shift: 1    Cardiac:   Cardiac Monitoring: Yes (removed this morning)     Cardiac Rhythm: Normal sinus rhythm    Access:   Current line(s): PIV     Genitourinary:   Urinary status: voiding    Respiratory:   O2 Device: Nasal cannula  Chronic home O2 use?: YES  Incentive spirometer at bedside: NO     GI:  Last Bowel Movement Date: 01/06/21  Current diet:  DIET DIABETIC CONSISTENT CARB Regular  DIET NUTRITIONAL SUPPLEMENTS All Meals; Glucerna Shake  Passing flatus: YES  Tolerating current diet: YES  % Diet Eaten: 75 %    Pain Management:   Patient states pain is manageable on current regimen: YES    Skin:  Mc Score: 20  Interventions: increase time out of bed    Patient Safety:  Fall Score:  Total Score: 4  Interventions: bed/chair alarm, gripper socks and pt to call before getting OOB  High Fall Risk: Yes    Length of Stay:  Expected LOS: 5d 12h  Actual LOS: [place] [address]

## 2021-01-07 NOTE — PROGRESS NOTES
5995 this writer spoke with patient in concern of her leaving. She expressed she was woken up early yesterday AM and told to find a way to take care of her self, she explains it was a Dominique who called her. This writer apologized for that, and expressed concern for her wellness. She stated she was home, and was dissatisfied for her care. This writer expressed

## 2021-01-07 NOTE — PROGRESS NOTES
0730: Verbal and Written shift change report given to [facility] (oncoming nurse) by Peyton Chavez (offgoing nurse). Report included the following information SBAR and Kardex. Offgoing RN communicated that pt reported she is leaving the hospital AMA. 0740: Pt reported Nova Bernal is leaving the hospital, this is the worst hospital administration but nursing was moshe. They expect me to go home or to a rehab and that's the best they can do. The news shows that the rehab places cannot even get oxygen tanks delivered. My mother and father are dying and I have no one to take care of me and that is the best they can do. I will leave so they can get another patient and push them out the way they do\". Pt tearful and appears very agitated and angry. Pt took off cardiac monitor and took out all peripheral IVs. Pt refusing VS and any nurse interaction at this time. Pt refusing to sign AMA form at this time. Pt was educated on risks of leaving the hospital against medical advice including death. 0750: Pt leaving the floor at this time.

## 2021-01-07 NOTE — PROGRESS NOTES
Hospitalist Progress Note    NAME: Paola Delgadillo   :  1975   MRN:  [id number]       Assessment / Plan:  Acute hypoxic respiratory failure POA  COVID-19 pneumonia POA  - covid swab positive at PCP office   - Admitted with increasing SOB  - Hypoxic, required 13 L high flow after admit  - Back on 2.0 liters       Sats okay at rest on room air 94%       Dropped to 75% on RA walking       sats dropped to 90% walking on 2 liters  - complete dexamethaxone and remdesivir(done)  - O2 challenge  - appreciate pulm consult  - zinc and vitamin C  - s/p empiric levaquin  - start discharge planning, pt has not support at home          admitted, multiple family members COVID +  CTA chest:  No acute pulmonary embolism. Marked patchy bilateral lung opacities in keeping with atypical/viral  infection. Hepatic steatosis. Anxiety/depression  -patient anxious/tearful  -xanax  prn    DM1  -has insulin pump  -infuses novolin, usually about 75 units daily  -given steroids Tx, will likely need additional insulin dosing  - , 317, 114, 139, 347  -SSI with POCs    RA  -on enbreal, last dose   -previously on methotrexate     HTN  -cont CCB    HLD  -cont statin        Code Status: Full code  Surrogate Decision Maker: Spouse     DVT Prophylaxis: Lovenox  GI Prophylaxis: not indicated     Baseline: Ambulatory     PPx[de-identified] lovenox     Subjective:     Chief Complaint / Reason for Physician Visit f/u COVID pneumonia  \"I don't have any help at home\"  SOB with exertion, O2 requirements improving  O2 2 liters currently  No other complaints  Discussed with RN events overnight.      Review of Systems:  Symptom Y/N Comments  Symptom Y/N Comments   Fever/Chills n   Chest Pain n    Poor Appetite    Edema     Cough y   Abdominal Pain n    Sputum n   Joint Pain     SOB/POWERS y   Pruritis/Rash     Nausea/vomit n   Tolerating PT/OT     Diarrhea n   Tolerating Diet y    Constipation n   Other       Could NOT obtain due to: Objective:     VITALS:   Last 24hrs VS reviewed since prior progress note. Most recent are:  Patient Vitals for the past 24 hrs:   Temp Pulse Resp BP SpO2   01/06/21 1959 97.8 °F (36.6 °C) 90  126/64 94 %   01/06/21 1943     97 %   01/06/21 1500 97.9 °F (36.6 °C) 92 19 123/87 97 %   01/06/21 1305     93 %   01/06/21 1101 98.2 °F (36.8 °C) 95 22 127/72 97 %   01/06/21 0740 98.2 °F (36.8 °C) 82 19 107/63 92 %   01/06/21 0736     95 %   01/06/21 0252 98.1 °F (36.7 °C) 67 18 (!) 116/59 98 %   01/06/21 0156     96 %   01/05/21 2230 98.2 °F (36.8 °C) 79 18 131/72 93 %       Intake/Output Summary (Last 24 hours) at 1/6/2021 2034  Last data filed at 1/5/2021 2230  Gross per 24 hour   Intake 120 ml   Output    Net 120 ml        I had a face to face encounter and independently examined this patient on 1/6/2021, as outlined below:    PHYSICAL EXAM:  General: WD, WN. Alert, cooperative, no acute distress    EENT:  Anicteric sclerae. MMM  Resp:  Few rhonchi, no rales/wheezing. No accessory muscle use  CV:  Regular  rhythm,  No edema  GI:  Soft, Non distended, Non tender. +Bowel sounds  Neurologic:  Alert and oriented X 3, normal speech,   Psych:   Good insight. Not anxious nor agitated  Skin:  No rashes. No jaundice    Reviewed most current lab test results and cultures  YES  Reviewed most current radiology test results   YES  Review and summation of old records today    NO  Reviewed patient's current orders and MAR    YES  PMH/SH reviewed - no change compared to H&P  ________________________________________________________________________  Care Plan discussed with:    Comments   Patient x    Family      RN x    Care Manager     Consultant                        Multidiciplinary team rounds were held today with , nursing, pharmacist and clinical coordinator. Patient's plan of care was discussed; medications were reviewed and discharge planning was addressed. ________________________________________________________________________        Comments   >50% of visit spent in counseling and coordination of care     ________________________________________________________________________  Christine Pruitt MD     Procedures: see electronic medical records for all procedures/Xrays and details which were not copied into this note but were reviewed prior to creation of Plan. LABS:  I reviewed today's most current labs and imaging studies.   Pertinent labs include:  Recent Labs     01/06/21  0300 01/04/21  0900   WBC 9.6 14.2*   HGB 12.5 14.1   HCT 38.9 43.4    425*     Recent Labs     01/06/21  0300 01/05/21  0309 01/04/21  1412    142 139   K 3.2* 3.4* 3.9    111* 107   CO2 25 25 27   * 116* 209*   BUN 18 20 22*   CREA 0.74 0.69 0.84   CA 7.9* 8.1* 8.9   ALB 2.2* 2.3*  --    TBILI 0.9 0.3  --    ALT 28 26  --        Signed: Christine Pruitt MD

## 2021-01-07 NOTE — PROGRESS NOTES
[address] confirmed oxygen is in place at home at 11:54 am.  No [facility] companies have accepted the referral..    Events of this morning reviewed with staff, MD, and Isabella Valencia, [address] [facility], [address]. Jaxson Morrison with [address] is working on oxygen delivery to the home. I broadened the search for a [address] [place] this morning with no success at present time.

## 2021-01-07 NOTE — PROGRESS NOTES
9565: PCT in to check pts. Blood sugar but pt. Refused. PCT stating pt was saying she wanted to go home right now. MARY Hernández in to check on pt. 3411: Pt. Sitting in chair, tele monitor and leads removed by pt. Pt very upset and angry stating, \" I am getting out of here today, no matter what I have to do. This has been the worst 24 hours of my life and I would rather go home and die than lay here one more day. \" Pts.  called into room to speak with pt. Pt. Stated she did have a ride home. Charge RN and CCL aware. 4293: Perfect serve sent to Dr. Edilma Coles regarding situation who stated he would be up shortly to see pt.

## 2021-01-07 NOTE — DISCHARGE SUMMARY
Hospitalist Discharge Note    NAME: Aiyana Hays   :  1975   MRN:  [id number]     Admit date: 2021    Discharge date: 2021    PCP: Mercy Coelho MD    Discharge Diagnoses:    Acute hypoxic respiratory failure POA    COVID-19 pneumonia POA    Anxiety/depression POA    DM type 1 on insulin pump POA with hyperglycemia on steroids    Rheumatoid arthritis on enbrel     Essential HTN    Hyperlipidemia POA     Code Status: Full code    Discharge Medications:  Resuming home meds, eloped from hospital      Follow-up Information     Follow up With Specialties Details Why Contact Info    Mercy Coelho MD Family [facility] Go on 2021 For Virtual hospital follow up appointment at 2:45PM Mark [phone]            Time spent on discharge:   I spent greater than 30 minutes on discharge, seeing and examining the patient, reconciling home meds and new meds, coordinating care with case management, doing the discharge papers and the D/C summary    Discharge disposition: eloped home without discharge, after pt left hospital, we set up home O2 and home health    Discharge Condition: Stable    Summary of admission H+P(copied from Dr Alexsandra Gandhi Note):     CHIEF COMPLAINT: SOB     HISTORY OF PRESENT ILLNESS:     Sudha Sharma is a 41 y.o.  female with a past medical history of hypertension, rheumatoid arthritis, diabetes mellitus, gastroparesis presented to ED with a chief complaint of shortness of breath and syncopal episodes. She reports she started having upper respiratory symptoms since , so she get checked for COVID-19 on , and it was positive. She endorses cough mainly dry, decreased p.o. intake, body ache, fever up to 103. Has been taking Tylenol and symptomatic treatment. Shortness of breath has been getting worse, she has Apple Watch shows saturation between 62 to 92% for past 3 days.   She also had a full syncopal episode 1 yesterday and 3 today, it was brief as per her but she lost consciousness. The ED she was saturating 78% on room air, tachypneic. She was placed on NRB.     We were asked to admit for work up and evaluation of the above problems.           Past Medical History:   Diagnosis Date    Diabetes ([facility]      Migraine      RA (rheumatoid arthritis) ([facility]                 Past Surgical History:   Procedure Laterality Date    HX GYN         Hysterectomy      pCXR read by radiology FINDINGS:   AP portable imaging of the chest performed at 3:04 PM demonstrates a  stable cardiomediastinal silhouette. There is diffuse bilateral patchy airspace  disease. No pleural effusion is evident. No significant osseous abnormalities  are seen. IMPRESSION: Diffuse bilateral patchy airspace disease is compatible with COVID  Pneumonia.     Hospital course:     Acute hypoxic respiratory failure POA  COVID-19 pneumonia POA  - covid swab positive at PCP office   - Admitted with increasing SOB  - Hypoxic, required 13 L high flow after admit  - weaned to  2.0 liters       Sats okay at rest on room air 94%       Dropped to 75% on RA walking       sats dropped to 90% walking on 2 liters  - complete dexamethaxone and remdesivir(done)       Completed 6 days dexamethasone in house  - O2 challenge  - appreciate pulm consult  - zinc and vitamin C  - s/p empiric levaquin x 5 days  - started discharge planning, pt has no support at home          admitted [facility] [place]         Multiple family members COVID +         Reportedly parents at [facility], one is terminally ill and  this AM per family  Morning of 2021 left hospital walking out door, did not wait for me to come talk to her        Severe stress with terminally ill family at another hospital  Administration and risk management notified       I spoke with , could not reach pt by phone       Eventually administration reached her       Set up home O2 and home health  She was close to discharge as O2 was down to 2 liters  Held further steroids as improved and with hyperglycemia    CTA chest:      No acute pulmonary embolism. Marked patchy bilateral lung opacities in keeping with atypical/viral infection. Hepatic steatosis. Anxiety/depression  -patient anxious/tearful about current situation, no SI  -xanax  prn    DM type 1 on insulin pump POA  -has insulin pump, continued in house  -infuses novolin, usually about 75 units daily  -given steroids Tx, will likely need additional insulin dosing  - , 317, 114, 139, 347  -SSI with POCs    RA  -on enbreal, last dose   -previously on methotrexate     HTN  -cont CCB    HLD  -cont statin        Code Status: Full code  Surrogate Decision Maker: Spouse     DVT Prophylaxis: Lovenox  GI Prophylaxis: not indicated     Baseline: Ambulatory     PPx[de-identified] lovenox     Subjective:     Chief Complaint / Reason for Physician Visit f/u COVID pneumonia  Left this AM, walked out of hospital on own  Parent  at [address] this AM, pt under tremendous stress  Spoke with nursing director of floor and risk management  Hospital with set up home O2 and home health  Spoke with  after patient left  Discussed with RN events overnight. Review of Systems:  Symptom Y/N Comments  Symptom Y/N Comments   Fever/Chills    Chest Pain     Poor Appetite    Edema     Cough    Abdominal Pain     Sputum    Joint Pain     SOB/POWERS    Pruritis/Rash     Nausea/vomit    Tolerating PT/OT     Diarrhea    Tolerating Diet     Constipation    Other       Could NOT obtain due to:      Objective:     VITALS:   Last 24hrs VS reviewed since prior progress note.  Most recent are:  Patient Vitals for the past 24 hrs:   Temp Pulse Resp BP SpO2   21 0316 98.1 °F (36.7 °C) 73 16 119/60 94 %   21 0153     97 %   21 2259 98 °F (36.7 °C) 75 18 131/68 94 %   21 1959 97.8 °F (36.6 °C) 90  126/64 94 %   21 [address]  97 %   01/06/21 1500 97.9 °F (36.6 °C) 92 19 123/87 97 %   01/06/21 1305     93 %   01/06/21 1101 98.2 °F (36.8 °C) 95 22 127/72 97 %     No intake or output data in the 24 hours ending 01/07/21 0235       Reviewed most current lab test results and cultures  YES  Reviewed most current radiology test results   YES  Review and summation of old records today    NO  Reviewed patient's current orders and MAR    YES  PMH/SH reviewed - no change compared to H&P  ________________________________________________________________________  Care Plan discussed with:    Comments   Patient     Family   x    RN x    Care Manager     Consultant                        Multidiciplinary team rounds were held today with , nursing, pharmacist and clinical coordinator. Patient's plan of care was discussed; medications were reviewed and discharge planning was addressed. ________________________________________________________________________        Comments   >50% of visit spent in counseling and coordination of care     ________________________________________________________________________  José Arellano MD     Procedures: see electronic medical records for all procedures/Xrays and details which were not copied into this note but were reviewed prior to creation of Plan. LABS:  I reviewed today's most current labs and imaging studies.   Pertinent labs include:  Recent Labs     01/06/21  0300   WBC 9.6   HGB 12.5   HCT 38.9        Recent Labs     01/06/21  0300 01/05/21  0309 01/04/21  1412    142 139   K 3.2* 3.4* 3.9    111* 107   CO2 25 25 27   * 116* 209*   BUN 18 20 22*   CREA 0.74 0.69 0.84   CA 7.9* 8.1* 8.9   ALB 2.2* 2.3*  --    TBILI 0.9 0.3  --    ALT 28 26  --        Signed: José Arellano MD

## 2021-01-07 NOTE — PROGRESS NOTES
Hospitalist    Pt left the hospital, took off O2 and monitors and left  Tried to call patient, no answer, left my cell phone number  Spoke with  in room, expressed my concerns  She was probably okay to go home with O2 and if help present  She was distressed about her families illnesses and going to a SNF        I tried to assure her about D/C planning last night  I called risk management, waiting for call back  Reached out to nurse manager, escalate to administration   We need to send someone to check on her ideally  Gave  my cell phone and floor number to give to patient to call  Will continue to follow    Christine Pruitt MD

## 2021-01-07 NOTE — PROGRESS NOTES
Problem: Falls - Risk of  Goal: *Absence of Falls  Description: Document Pop Maldonado Fall Risk and appropriate interventions in the flowsheet. Outcome: Progressing Towards Goal  Note: Fall Risk Interventions:  Mobility Interventions: Bed/chair exit alarm, Patient to call before getting OOB, PT Consult for mobility concerns         Medication Interventions: Bed/chair exit alarm, Patient to call before getting OOB    Elimination Interventions: Bed/chair exit alarm, Call light in reach, Patient to call for help with toileting needs    History of Falls Interventions: Bed/chair exit alarm, Investigate reason for fall         Problem: Patient Education: Go to Patient Education Activity  Goal: Patient/Family Education  Outcome: Progressing Towards Goal     Problem: Breathing Pattern - Ineffective  Goal: *Absence of hypoxia  Outcome: Progressing Towards Goal  Goal: *Use of effective breathing techniques  Outcome: Progressing Towards Goal  Goal: *PALLIATIVE CARE:  Alleviation of Dyspnea  Outcome: Progressing Towards Goal     Problem: Patient Education: Go to Patient Education Activity  Goal: Patient/Family Education  Outcome: Progressing Towards Goal     Problem: Airway Clearance - Ineffective  Goal: Achieve or maintain patent airway  Outcome: Progressing Towards Goal     Problem: Gas Exchange - Impaired  Goal: Absence of hypoxia  Outcome: Progressing Towards Goal  Goal: Promote optimal lung function  Outcome: Progressing Towards Goal     Problem: Breathing Pattern - Ineffective  Goal: Ability to achieve and maintain a regular respiratory rate  Outcome: Progressing Towards Goal     Problem:  Body Temperature -  Risk of, Imbalanced  Goal: Ability to maintain a body temperature within defined limits  Outcome: Progressing Towards Goal  Goal: Will regain or maintain usual level of consciousness  Outcome: Progressing Towards Goal  Goal: Complications related to the disease process, condition or treatment will be avoided or minimized  Outcome: Progressing Towards Goal     Problem: Isolation Precautions - Risk of Spread of Infection  Goal: Prevent transmission of infectious organism to others  Outcome: Progressing Towards Goal     Problem: Nutrition Deficits  Goal: Optimize nutrtional status  Outcome: Progressing Towards Goal     Problem: Risk for Fluid Volume Deficit  Goal: Maintain normal heart rhythm  Outcome: Progressing Towards Goal  Goal: Maintain absence of muscle cramping  Outcome: Progressing Towards Goal  Goal: Maintain normal serum potassium, sodium, calcium, phosphorus, and pH  Outcome: Progressing Towards Goal     Problem: Loneliness or Risk for Loneliness  Goal: Demonstrate positive use of time alone when socialization is not possible  Outcome: Progressing Towards Goal     Problem: Fatigue  Goal: Verbalize increase energy and improved vitality  Outcome: Progressing Towards Goal     Problem: Patient Education: Go to Patient Education Activity  Goal: Patient/Family Education  Outcome: Progressing Towards Goal     Problem: Pneumonia: Day 4  Goal: Off Pathway (Use only if patient is Off Pathway)  Outcome: Progressing Towards Goal  Goal: Activity/Safety  Outcome: Progressing Towards Goal  Goal: Nutrition/Diet  Outcome: Progressing Towards Goal  Goal: Discharge Planning  Outcome: Progressing Towards Goal  Goal: Medications  Outcome: Progressing Towards Goal  Goal: Respiratory  Outcome: Progressing Towards Goal  Goal: Treatments/Interventions/Procedures  Outcome: Progressing Towards Goal  Goal: Psychosocial  Outcome: Progressing Towards Goal     Problem: Patient Education: Go to Patient Education Activity  Goal: Patient/Family Education  Outcome: Progressing Towards Goal

## 2021-01-07 NOTE — PROGRESS NOTES
0700  Received report Ebenezer Mendoza RN. Assumed care of patient. 1900  End of Shift Note    Bedside shift change report given to Ramesh Barrientos RN (oncoming nurse) by Emiliana Elena RN (offgoing nurse). Report included the following information SBAR, Kardex, MAR and Cardiac Rhythm NSR/Tach    Shift worked:  7a-7p     Shift summary and any significant changes:     desats on exertion     Concerns for physician to address:  na     Zone phone for oncoming shift:   na       Activity:  Activity Level: Up with Assistance  Number times ambulated in hallways past shift: 0  Number of times OOB to chair past shift: 1    Cardiac:   Cardiac Monitoring: Yes      Cardiac Rhythm: Normal sinus rhythm    Access:   Current line(s): PIV     Genitourinary:   Urinary status: voiding    Respiratory:   O2 Device: Nasal cannula  Chronic home O2 use?: NO  Incentive spirometer at bedside: YES     GI:  Last Bowel Movement Date: 01/06/20  Current diet:  DIET DIABETIC CONSISTENT CARB Regular  DIET NUTRITIONAL SUPPLEMENTS All Meals; Glucerna Shake  Passing flatus: YES  Tolerating current diet: YES  % Diet Eaten: 75 %    Pain Management:   Patient states pain is manageable on current regimen: YES    Skin:  Mc Score: 19  Interventions: increase time out of bed and PT/OT consult    Patient Safety:  Fall Score:  Total Score: 4  Interventions: bed/chair alarm and gripper socks  High Fall Risk: Yes    Length of Stay:  Expected LOS: 5d 12h  Actual LOS: [id number] B Ronal Mcguire RN

## 2021-01-08 ENCOUNTER — PATIENT OUTREACH (OUTPATIENT)
Dept: CASE MANAGEMENT | Age: 46
End: 2021-01-08

## 2021-01-08 NOTE — PROGRESS NOTES
Contacted patient for follow up. Pt stated she did not wish to be contacted again and hung up.  Resolving episode of care

## 2021-01-08 NOTE — PROGRESS NOTES
[facility] have denied accepting referral.  I've reached out to 7 other companies 3 times with no response.   I'll close out the referral.  Discussed with MD.

## 2021-05-15 ENCOUNTER — APPOINTMENT (OUTPATIENT)
Dept: GENERAL RADIOLOGY | Age: 46
DRG: 637 | End: 2021-05-15
Attending: EMERGENCY MEDICINE
Payer: COMMERCIAL

## 2021-05-15 ENCOUNTER — HOSPITAL ENCOUNTER (INPATIENT)
Age: 46
LOS: 1 days | Discharge: LEFT BEFORE DISCHARGE | DRG: 637 | End: 2021-05-16
Attending: EMERGENCY MEDICINE | Admitting: INTERNAL MEDICINE
Payer: COMMERCIAL

## 2021-05-15 ENCOUNTER — APPOINTMENT (OUTPATIENT)
Dept: CT IMAGING | Age: 46
DRG: 637 | End: 2021-05-15
Attending: EMERGENCY MEDICINE
Payer: COMMERCIAL

## 2021-05-15 ENCOUNTER — APPOINTMENT (OUTPATIENT)
Dept: CT IMAGING | Age: 46
DRG: 637 | End: 2021-05-15
Attending: PHYSICIAN ASSISTANT
Payer: COMMERCIAL

## 2021-05-15 DIAGNOSIS — A41.9 SEPSIS, DUE TO UNSPECIFIED ORGANISM, UNSPECIFIED WHETHER ACUTE ORGAN DYSFUNCTION PRESENT (HCC): ICD-10-CM

## 2021-05-15 DIAGNOSIS — R73.9 HYPERGLYCEMIA: ICD-10-CM

## 2021-05-15 DIAGNOSIS — R55 SYNCOPE, UNSPECIFIED SYNCOPE TYPE: Primary | ICD-10-CM

## 2021-05-15 LAB
ALBUMIN SERPL-MCNC: 3.7 G/DL (ref 3.5–5)
ALBUMIN/GLOB SERPL: 0.8 {RATIO} (ref 1.1–2.2)
ALP SERPL-CCNC: 149 U/L (ref 45–117)
ALT SERPL-CCNC: 108 U/L (ref 12–78)
AMPHET UR QL SCN: NEGATIVE
ANION GAP SERPL CALC-SCNC: 10 MMOL/L (ref 5–15)
APPEARANCE UR: CLEAR
AST SERPL W P-5'-P-CCNC: 176 U/L (ref 15–37)
BACTERIA URNS QL MICRO: NEGATIVE /HPF
BARBITURATES UR QL SCN: NEGATIVE
BASE DEFICIT BLDV-SCNC: 3.2 MMOL/L (ref 0–2)
BASOPHILS # BLD: 0.1 K/UL (ref 0–0.1)
BASOPHILS NFR BLD: 1 % (ref 0–1)
BDY SITE: ABNORMAL
BENZODIAZ UR QL: NEGATIVE
BILIRUB SERPL-MCNC: 0.5 MG/DL (ref 0.2–1)
BILIRUB UR QL: NEGATIVE
BUN SERPL-MCNC: 10 MG/DL (ref 6–20)
BUN/CREAT SERPL: 8 (ref 12–20)
CA-I BLD-MCNC: 9.6 MG/DL (ref 8.5–10.1)
CANNABINOIDS UR QL SCN: POSITIVE
CHLORIDE SERPL-SCNC: 98 MMOL/L (ref 97–108)
CO2 SERPL-SCNC: 24 MMOL/L (ref 21–32)
COCAINE UR QL SCN: NEGATIVE
COLOR UR: ABNORMAL
CREAT SERPL-MCNC: 1.27 MG/DL (ref 0.55–1.02)
DIFFERENTIAL METHOD BLD: ABNORMAL
DRUG SCRN COMMENT,DRGCM: ABNORMAL
EOSINOPHIL # BLD: 0.1 K/UL (ref 0–0.4)
EOSINOPHIL NFR BLD: 1 % (ref 0–7)
EPAP/CPAP/PEEP, PAPEEP: 0
ERYTHROCYTE [DISTWIDTH] IN BLOOD BY AUTOMATED COUNT: 14 % (ref 11.5–14.5)
ETHANOL SERPL-MCNC: <4 MG/DL
FIO2 ON VENT: 100 %
GAS FLOW.O2 O2 DELIVERY SYS: 15 L/MIN
GLOBULIN SER CALC-MCNC: 4.5 G/DL (ref 2–4)
GLUCOSE BLD STRIP.AUTO-MCNC: 194 MG/DL (ref 65–117)
GLUCOSE BLD STRIP.AUTO-MCNC: 199 MG/DL (ref 65–117)
GLUCOSE BLD STRIP.AUTO-MCNC: 218 MG/DL (ref 65–117)
GLUCOSE BLD STRIP.AUTO-MCNC: 228 MG/DL (ref 65–117)
GLUCOSE BLD STRIP.AUTO-MCNC: 594 MG/DL (ref 65–117)
GLUCOSE SERPL-MCNC: 639 MG/DL (ref 65–100)
GLUCOSE UR STRIP.AUTO-MCNC: >300 MG/DL
HCO3 BLDV-SCNC: 21 MMOL/L (ref 22–26)
HCT VFR BLD AUTO: 49.4 % (ref 35–47)
HGB BLD-MCNC: 15.2 G/DL (ref 11.5–16)
HGB UR QL STRIP: ABNORMAL
IMM GRANULOCYTES # BLD AUTO: 0.1 K/UL (ref 0–0.04)
IMM GRANULOCYTES NFR BLD AUTO: 1 % (ref 0–0.5)
KETONES UR QL STRIP.AUTO: 5 MG/DL
LACTATE SERPL-SCNC: 2.2 MMOL/L (ref 0.4–2)
LACTATE SERPL-SCNC: 7.7 MMOL/L (ref 0.4–2)
LEUKOCYTE ESTERASE UR QL STRIP.AUTO: NEGATIVE
LYMPHOCYTES # BLD: 4.4 K/UL (ref 0.8–3.5)
LYMPHOCYTES NFR BLD: 44 % (ref 12–49)
MAGNESIUM SERPL-MCNC: 2.3 MG/DL (ref 1.6–2.4)
MCH RBC QN AUTO: 28.8 PG (ref 26–34)
MCHC RBC AUTO-ENTMCNC: 30.8 G/DL (ref 30–36.5)
MCV RBC AUTO: 93.6 FL (ref 80–99)
METHADONE UR QL: NEGATIVE
MONOCYTES # BLD: 0.4 K/UL (ref 0–1)
MONOCYTES NFR BLD: 4 % (ref 5–13)
NEUTS SEG # BLD: 5 K/UL (ref 1.8–8)
NEUTS SEG NFR BLD: 49 % (ref 32–75)
NITRITE UR QL STRIP.AUTO: NEGATIVE
NRBC # BLD: 0 K/UL (ref 0–0.01)
NRBC BLD-RTO: 0 PER 100 WBC
OPIATES UR QL: NEGATIVE
PCO2 BLDV: 57.6 MMHG (ref 40–50)
PCP UR QL: NEGATIVE
PERFORMED BY, TECHID: ABNORMAL
PH BLDV: 7.23 [PH] (ref 7.31–7.41)
PH UR STRIP: 7 [PH] (ref 5–8)
PLATELET # BLD AUTO: 232 K/UL (ref 150–400)
PMV BLD AUTO: 11.7 FL (ref 8.9–12.9)
PO2 BLDV: 41 MMHG (ref 36–42)
POTASSIUM SERPL-SCNC: 5.2 MMOL/L (ref 3.5–5.1)
PROT SERPL-MCNC: 8.2 G/DL (ref 6.4–8.2)
PROT UR STRIP-MCNC: NEGATIVE MG/DL
RBC # BLD AUTO: 5.28 M/UL (ref 3.8–5.2)
RBC #/AREA URNS HPF: ABNORMAL /HPF (ref 0–5)
SAO2 % BLDV: 78 % (ref 60–80)
SODIUM SERPL-SCNC: 132 MMOL/L (ref 136–145)
SP GR UR REFRACTOMETRY: 1.02 (ref 1–1.03)
TROPONIN I SERPL-MCNC: <0.05 NG/ML
UA: UC IF INDICATED,UAUC: ABNORMAL
UROBILINOGEN UR QL STRIP.AUTO: 0.1 EU/DL (ref 0.1–1)
WBC # BLD AUTO: 10.2 K/UL (ref 3.6–11)
WBC URNS QL MICRO: ABNORMAL /HPF (ref 0–4)

## 2021-05-15 PROCEDURE — 82803 BLOOD GASES ANY COMBINATION: CPT

## 2021-05-15 PROCEDURE — 85025 COMPLETE CBC W/AUTO DIFF WBC: CPT

## 2021-05-15 PROCEDURE — 71045 X-RAY EXAM CHEST 1 VIEW: CPT

## 2021-05-15 PROCEDURE — 96374 THER/PROPH/DIAG INJ IV PUSH: CPT

## 2021-05-15 PROCEDURE — 74011250637 HC RX REV CODE- 250/637: Performed by: PHYSICIAN ASSISTANT

## 2021-05-15 PROCEDURE — 99218 HC RM OBSERVATION: CPT

## 2021-05-15 PROCEDURE — 93005 ELECTROCARDIOGRAM TRACING: CPT

## 2021-05-15 PROCEDURE — 36415 COLL VENOUS BLD VENIPUNCTURE: CPT

## 2021-05-15 PROCEDURE — 87040 BLOOD CULTURE FOR BACTERIA: CPT

## 2021-05-15 PROCEDURE — 83605 ASSAY OF LACTIC ACID: CPT

## 2021-05-15 PROCEDURE — 99285 EMERGENCY DEPT VISIT HI MDM: CPT

## 2021-05-15 PROCEDURE — 74011636637 HC RX REV CODE- 636/637: Performed by: EMERGENCY MEDICINE

## 2021-05-15 PROCEDURE — 74011000258 HC RX REV CODE- 258: Performed by: EMERGENCY MEDICINE

## 2021-05-15 PROCEDURE — 74011250636 HC RX REV CODE- 250/636: Performed by: PHYSICIAN ASSISTANT

## 2021-05-15 PROCEDURE — 71250 CT THORAX DX C-: CPT

## 2021-05-15 PROCEDURE — 65270000029 HC RM PRIVATE

## 2021-05-15 PROCEDURE — 80307 DRUG TEST PRSMV CHEM ANLYZR: CPT

## 2021-05-15 PROCEDURE — 82077 ASSAY SPEC XCP UR&BREATH IA: CPT

## 2021-05-15 PROCEDURE — 83036 HEMOGLOBIN GLYCOSYLATED A1C: CPT

## 2021-05-15 PROCEDURE — 81001 URINALYSIS AUTO W/SCOPE: CPT

## 2021-05-15 PROCEDURE — 82962 GLUCOSE BLOOD TEST: CPT

## 2021-05-15 PROCEDURE — 70450 CT HEAD/BRAIN W/O DYE: CPT

## 2021-05-15 PROCEDURE — 96375 TX/PRO/DX INJ NEW DRUG ADDON: CPT

## 2021-05-15 PROCEDURE — 84484 ASSAY OF TROPONIN QUANT: CPT

## 2021-05-15 PROCEDURE — 74011636637 HC RX REV CODE- 636/637: Performed by: PHYSICIAN ASSISTANT

## 2021-05-15 PROCEDURE — 80053 COMPREHEN METABOLIC PANEL: CPT

## 2021-05-15 PROCEDURE — 74011250636 HC RX REV CODE- 250/636: Performed by: EMERGENCY MEDICINE

## 2021-05-15 PROCEDURE — 83735 ASSAY OF MAGNESIUM: CPT

## 2021-05-15 PROCEDURE — 96361 HYDRATE IV INFUSION ADD-ON: CPT

## 2021-05-15 RX ORDER — ACETAMINOPHEN 650 MG/1
650 SUPPOSITORY RECTAL
Status: DISCONTINUED | OUTPATIENT
Start: 2021-05-15 | End: 2021-05-16 | Stop reason: HOSPADM

## 2021-05-15 RX ORDER — INSULIN GLARGINE 100 [IU]/ML
0.2 INJECTION, SOLUTION SUBCUTANEOUS
Status: DISCONTINUED | OUTPATIENT
Start: 2021-05-15 | End: 2021-05-16 | Stop reason: HOSPADM

## 2021-05-15 RX ORDER — LEVOFLOXACIN 5 MG/ML
750 INJECTION, SOLUTION INTRAVENOUS EVERY 24 HOURS
Status: DISCONTINUED | OUTPATIENT
Start: 2021-05-15 | End: 2021-05-16 | Stop reason: HOSPADM

## 2021-05-15 RX ORDER — MORPHINE SULFATE 2 MG/ML
2 INJECTION, SOLUTION INTRAMUSCULAR; INTRAVENOUS
Status: DISCONTINUED | OUTPATIENT
Start: 2021-05-15 | End: 2021-05-16 | Stop reason: HOSPADM

## 2021-05-15 RX ORDER — DEXTROSE 50 % IN WATER (D50W) INTRAVENOUS SYRINGE
25 AS NEEDED
Status: DISCONTINUED | OUTPATIENT
Start: 2021-05-15 | End: 2021-05-16 | Stop reason: HOSPADM

## 2021-05-15 RX ORDER — DILTIAZEM HYDROCHLORIDE EXTENDED-RELEASE TABLETS 240 MG/1
240 TABLET, EXTENDED RELEASE ORAL DAILY
COMMUNITY

## 2021-05-15 RX ORDER — DEXTROSE 50 % IN WATER (D50W) INTRAVENOUS SYRINGE
25-50 AS NEEDED
Status: DISCONTINUED | OUTPATIENT
Start: 2021-05-15 | End: 2021-05-16 | Stop reason: HOSPADM

## 2021-05-15 RX ORDER — ONDANSETRON 2 MG/ML
4 INJECTION INTRAMUSCULAR; INTRAVENOUS
Status: DISCONTINUED | OUTPATIENT
Start: 2021-05-15 | End: 2021-05-16 | Stop reason: HOSPADM

## 2021-05-15 RX ORDER — PROMETHAZINE HYDROCHLORIDE 25 MG/1
12.5 TABLET ORAL
Status: DISCONTINUED | OUTPATIENT
Start: 2021-05-15 | End: 2021-05-16 | Stop reason: HOSPADM

## 2021-05-15 RX ORDER — MAGNESIUM SULFATE 100 %
4 CRYSTALS MISCELLANEOUS AS NEEDED
Status: DISCONTINUED | OUTPATIENT
Start: 2021-05-15 | End: 2021-05-16 | Stop reason: HOSPADM

## 2021-05-15 RX ORDER — SUCRALFATE 1 G/1
1 TABLET ORAL
Status: DISCONTINUED | OUTPATIENT
Start: 2021-05-15 | End: 2021-05-16 | Stop reason: HOSPADM

## 2021-05-15 RX ORDER — ACETAMINOPHEN 325 MG/1
650 TABLET ORAL
Status: DISCONTINUED | OUTPATIENT
Start: 2021-05-15 | End: 2021-05-16 | Stop reason: HOSPADM

## 2021-05-15 RX ORDER — SODIUM CHLORIDE 0.9 % (FLUSH) 0.9 %
5-40 SYRINGE (ML) INJECTION EVERY 8 HOURS
Status: DISCONTINUED | OUTPATIENT
Start: 2021-05-15 | End: 2021-05-15

## 2021-05-15 RX ORDER — ENOXAPARIN SODIUM 100 MG/ML
40 INJECTION SUBCUTANEOUS DAILY
Status: DISCONTINUED | OUTPATIENT
Start: 2021-05-16 | End: 2021-05-16 | Stop reason: HOSPADM

## 2021-05-15 RX ORDER — SODIUM CHLORIDE 0.9 % (FLUSH) 0.9 %
5-40 SYRINGE (ML) INJECTION AS NEEDED
Status: DISCONTINUED | OUTPATIENT
Start: 2021-05-15 | End: 2021-05-16 | Stop reason: HOSPADM

## 2021-05-15 RX ORDER — BUPROPION HYDROCHLORIDE 150 MG/1
150 TABLET ORAL
COMMUNITY

## 2021-05-15 RX ORDER — OXYCODONE HYDROCHLORIDE 5 MG/1
5 TABLET ORAL
Status: DISCONTINUED | OUTPATIENT
Start: 2021-05-15 | End: 2021-05-16 | Stop reason: HOSPADM

## 2021-05-15 RX ORDER — METRONIDAZOLE 500 MG/100ML
500 INJECTION, SOLUTION INTRAVENOUS EVERY 8 HOURS
Status: DISCONTINUED | OUTPATIENT
Start: 2021-05-15 | End: 2021-05-16 | Stop reason: HOSPADM

## 2021-05-15 RX ORDER — TRAMADOL HYDROCHLORIDE AND ACETAMINOPHEN 37.5; 325 MG/1; MG/1
2 TABLET ORAL
COMMUNITY

## 2021-05-15 RX ORDER — INSULIN LISPRO 100 [IU]/ML
INJECTION, SOLUTION INTRAVENOUS; SUBCUTANEOUS
Status: DISCONTINUED | OUTPATIENT
Start: 2021-05-15 | End: 2021-05-16 | Stop reason: HOSPADM

## 2021-05-15 RX ORDER — POLYETHYLENE GLYCOL 3350 17 G/17G
17 POWDER, FOR SOLUTION ORAL DAILY PRN
Status: DISCONTINUED | OUTPATIENT
Start: 2021-05-15 | End: 2021-05-16 | Stop reason: HOSPADM

## 2021-05-15 RX ADMIN — FAMOTIDINE 20 MG: 10 INJECTION, SOLUTION INTRAVENOUS at 20:23

## 2021-05-15 RX ADMIN — ACETAMINOPHEN 650 MG: 325 TABLET, FILM COATED ORAL at 20:23

## 2021-05-15 RX ADMIN — PIPERACILLIN AND TAZOBACTAM 3.38 G: 3; .375 INJECTION, POWDER, LYOPHILIZED, FOR SOLUTION INTRAVENOUS at 11:06

## 2021-05-15 RX ADMIN — SODIUM CHLORIDE 10 UNITS/HR: 0.9 INJECTION INTRAVENOUS at 10:34

## 2021-05-15 RX ADMIN — ONDANSETRON 4 MG: 2 INJECTION INTRAMUSCULAR; INTRAVENOUS at 16:19

## 2021-05-15 RX ADMIN — INSULIN HUMAN 10 UNITS: 100 INJECTION, SOLUTION PARENTERAL at 10:31

## 2021-05-15 RX ADMIN — INSULIN LISPRO 4 UNITS: 100 INJECTION, SOLUTION INTRAVENOUS; SUBCUTANEOUS at 16:45

## 2021-05-15 RX ADMIN — LEVOFLOXACIN 750 MG: 5 INJECTION, SOLUTION INTRAVENOUS at 15:37

## 2021-05-15 RX ADMIN — METRONIDAZOLE 500 MG: 500 INJECTION, SOLUTION INTRAVENOUS at 19:10

## 2021-05-15 RX ADMIN — SODIUM CHLORIDE 1000 ML: 9 INJECTION, SOLUTION INTRAVENOUS at 12:17

## 2021-05-15 RX ADMIN — SODIUM CHLORIDE 1000 ML: 9 INJECTION, SOLUTION INTRAVENOUS at 10:30

## 2021-05-15 RX ADMIN — SODIUM CHLORIDE 1000 ML: 9 INJECTION, SOLUTION INTRAVENOUS at 08:30

## 2021-05-15 NOTE — ROUTINE PROCESS
TRANSFER - OUT REPORT: 
 
Verbal report given to Alok RN (name) on [facility] Ruben  being transferred to [facility](unit) for routine progression of care Report consisted of patients Situation, Background, Assessment and  
Recommendations(SBAR). Information from the following report(s) SBAR was reviewed with the receiving nurse. Lines:  
Peripheral IV 05/15/21 Left Antecubital (Active) Peripheral IV 05/15/21 Posterior;Right Hand (Active) Opportunity for questions and clarification was provided.    
 
Patient transported with: 
 Registered Nurse and non-rebreather at 15l/min O2

## 2021-05-15 NOTE — H&P
History and Physical    Patient: Lia Durant MRN: [id number]  SSN: [id number]    YOB: 1975  Age: 41 y.o. Sex: female      Subjective: Lia Durant is a 41 y.o. female with a PMH of diabetes, rheumatoid arthritis, GERD, essential hypertension, and iron deficiency anemia who presents to the emergency department with syncope and a glucose of 639. Patient was camping and found unresponsive by a significant other who performed CPR. Patient states she has vomited since arriving to the emergency department. She also complains of chills, abdominal pain, and chest wall tenderness. Patient denies SOB, angina, nausea, dizziness. In addition, patient has profound lactic acidosis of greater than seven. Past Medical History:   Diagnosis Date    Diabetes ([address]     Essential hypertension     GERD (gastroesophageal reflux disease)     Iron deficiency anemia     Migraine     Peripheral neuralgia     PTSD (post-traumatic stress disorder)     RA ([address]heumatoid [address]) ([facility] [address]      Past Surgical History:   Procedure Laterality Date    HX GYN      Hysterectomy 2010      Family History   Problem Relation Age of Onset    Breast Cancer Mother 58    Heart Attack Mother     Stroke Mother     Breast Cancer Maternal Aunt 50    Elevated Lipids Father     Heart Attack Father     Breast Cancer Maternal Grandmother      Social History     Tobacco Use    Smoking status: Never Smoker   Substance Use Topics    Alcohol use: Yes     Comment: rarely      Prior to Admission medications    Medication Sig Start Date End Date Taking? Authorizing Provider   dilTIAZem ER (Cardizem LA) 240 mg tablet Take 240 mg by mouth daily. Yes Provider, Historical   buPROPion XL (Wellbutrin XL) 150 mg tablet Take 150 mg by mouth every morning. Yes Provider, Historical   traMADol-acetaminophen (ULTRACET) 37.5-325 mg per tablet Take 2 Tabs by mouth every four (4) hours as needed for Pain.    Yes Provider, Historical DULoxetine (CYMBALTA) 60 mg capsule Take 60 mg by mouth daily. Provider, Historical   etanercept (ENBREL) 50 mg/mL (0.98 mL) injection 50 mg by SubCUTAneous route. Provider, Historical   fexofenadine (ALLEGRA) 60 mg tablet Take 60 mg by mouth two (2) times a day. Provider, Historical   diclofenac (VOLTAREN) 1 % gel Apply 2 g to affected area four (4) times daily. Provider, Historical   leucovorin calcium (WELLCOVORIN) 5 mg tablet Take 10 mg by mouth daily as needed (Take for 2 days after Methotrexate). Provider, Historical   methotrexate, PF, 25 mg/mL injection every seven (7) days. Takes 8 units/ weekly    Provider, Historical   sulindac (CLINORIL) 200 mg tablet Take 400 mg by mouth two (2) times a day. Provider, Historical   fluconazole (DIFLUCAN) 150 mg tablet Take 150 mg by mouth daily. Provider, Historical   simvastatin (ZOCOR) 20 mg tablet Take 20 mg by mouth nightly. Provider, Historical   cyclobenzaprine (FLEXERIL) 10 mg tablet Take 10 mg by mouth nightly. Provider, Historical   insulin aspart (NOVOLOG) 100 unit/mL injection by SubCUTAneous route every twenty-four (24) hours. As per pump protocol, roughly 75units daily    Provider, Historical   pantoprazole (PROTONIX) 40 mg tablet Take 40 mg by mouth daily. Provider, Historical   diltiazem hcl 180 mg Tb24 Take 1 Cap by mouth daily. Provider, Historical   gabapentin (NEURONTIN) 300 mg capsule Take 300 mg by mouth nightly. Provider, Historical   oxyCODONE-acetaminophen (PERCOCET) 5-325 mg per tablet Take 1 Tab by mouth every four (4) hours as needed for Pain. Max Daily Amount: 6 Tabs.  6/1/16   Chuy Solorio MD        Allergies   Allergen Reactions    Latex Hives    Amoxicillin Unknown (comments)    Aspirin Nausea Only    Clindamycin Rash    Iodine Hives    Pcn [Penicillins] Rash    Penicillin V Potassium Other (comments)    Shellfish Derived Hives    Sulfa (Sulfonamide Antibiotics) Unknown (comments) Review of Systems:  Review of Systems   Constitutional: Positive for chills. Negative for diaphoresis, fever, malaise/fatigue and weight loss. Respiratory: Negative for cough, hemoptysis, sputum production and shortness of breath. Cardiovascular: Negative for chest pain, palpitations, orthopnea and leg swelling. Gastrointestinal: Positive for abdominal pain and vomiting. Negative for constipation, diarrhea and nausea. Musculoskeletal:        Chest tenderness   Skin: Negative. Neurological: Positive for loss of consciousness. Negative for dizziness, speech change, focal weakness, seizures, weakness and headaches. Psychiatric/Behavioral: Negative for suicidal ideas. The patient is nervous/anxious. Objective:     Recent Results (from the past 24 hour(s))   CBC WITH AUTOMATED DIFF    Collection Time: 05/15/21  7:00 AM   Result Value Ref Range    WBC 10.2 3.6 - 11.0 K/uL    RBC 5.28 (H) 3.80 - 5.20 M/uL    HGB 15.2 11.5 - 16.0 g/dL    HCT 49.4 (H) 35.0 - 47.0 %    MCV 93.6 80.0 - 99.0 FL    MCH 28.8 26.0 - 34.0 PG    MCHC 30.8 30.0 - 36.5 g/dL    RDW 14.0 11.5 - 14.5 %    PLATELET 086 832 - 531 K/uL    MPV 11.7 8.9 - 12.9 FL    NRBC 0.0 0.0  WBC    ABSOLUTE NRBC 0.00 0.00 - 0.01 K/uL    NEUTROPHILS 49 32 - 75 %    LYMPHOCYTES 44 12 - 49 %    MONOCYTES 4 (L) 5 - 13 %    EOSINOPHILS 1 0 - 7 %    BASOPHILS 1 0 - 1 %    IMMATURE GRANULOCYTES 1 (H) 0 - 0.5 %    ABS. NEUTROPHILS 5.0 1.8 - 8.0 K/UL    ABS. LYMPHOCYTES 4.4 (H) 0.8 - 3.5 K/UL    ABS. MONOCYTES 0.4 0.0 - 1.0 K/UL    ABS. EOSINOPHILS 0.1 0.0 - 0.4 K/UL    ABS. BASOPHILS 0.1 0.0 - 0.1 K/UL    ABS. IMM.  GRANS. 0.1 (H) 0.00 - 0.04 K/UL    DF AUTOMATED     METABOLIC PANEL, COMPREHENSIVE    Collection Time: 05/15/21  7:00 AM   Result Value Ref Range    Sodium 132 (L) 136 - 145 mmol/L    Potassium 5.2 (H) 3.5 - 5.1 mmol/L    Chloride 98 97 - 108 mmol/L    CO2 24 21 - 32 mmol/L    Anion gap 10 5 - 15 mmol/L    Glucose 639 (HH) 65 - 100 mg/dL    BUN 10 6 - 20 mg/dL    Creatinine 1.27 (H) 0.55 - 1.02 mg/dL    BUN/Creatinine ratio 8 (L) 12 - 20      GFR est AA 55 (L) >60 ml/min/1.73m2    GFR est non-AA 46 (L) >60 ml/min/1.73m2    Calcium 9.6 8.5 - 10.1 mg/dL    Bilirubin, total 0.5 0.2 - 1.0 mg/dL    AST (SGOT) 176 (H) 15 - 37 U/L    ALT (SGPT) 108 (H) 12 - 78 U/L    Alk. phosphatase 149 (H) 45 - 117 U/L    Protein, total 8.2 6.4 - 8.2 g/dL    Albumin 3.7 3.5 - 5.0 g/dL    Globulin 4.5 (H) 2.0 - 4.0 g/dL    A-G Ratio 0.8 (L) 1.1 - 2.2     URINALYSIS W/ REFLEX CULTURE    Collection Time: 05/15/21  7:00 AM    Specimen: Urine   Result Value Ref Range    Color Yellow/Straw      Appearance Clear Clear      Specific gravity 1.025 1.003 - 1.030      pH (UA) 7.0 5.0 - 8.0      Protein Negative Negative mg/dL    Glucose >300 (A) Negative mg/dL    Ketone 5 (A) Negative mg/dL    Bilirubin Negative Negative      Blood Small (A) Negative      Urobilinogen 0.1 0.1 - 1.0 EU/dL    Nitrites Negative Negative      Leukocyte Esterase Negative Negative      UA:UC IF INDICATED Culture not indicated by UA result Culture not indicated by UA result      WBC 5-10 0 - 4 /hpf    RBC 10-20 0 - 5 /hpf    Bacteria Negative Negative /hpf   DRUG SCREEN, URINE    Collection Time: 05/15/21  7:00 AM   Result Value Ref Range    AMPHETAMINES Negative Negative      BARBITURATES Negative Negative      BENZODIAZEPINES Negative Negative      COCAINE Negative Negative      METHADONE Negative Negative      OPIATES Negative Negative      PCP(PHENCYCLIDINE) Negative Negative      THC (TH-CANNABINOL) Positive (A) Negative      Drug screen comment        This test is a screen for drugs of abuse in a medical setting only (i.e., they are unconfirmed results and as such must not be used for non-medical purposes, e.g.,employment testing, legal testing). Due to its inherent nature, false positive (FP) and false negative (FN) results may be obtained.  Therefore, if necessary for medical care, recommend confirmation of positive findings by GC/MS. TROPONIN I    Collection Time: 05/15/21  7:00 AM   Result Value Ref Range    Troponin-I, Qt. <0.05 <0.05 ng/mL   ETHYL ALCOHOL    Collection Time: 05/15/21  7:00 AM   Result Value Ref Range    ALCOHOL(ETHYL),SERUM <4 <10 mg/dL   LACTIC ACID    Collection Time: 05/15/21  7:00 AM   Result Value Ref Range    Lactic acid 7.7 (HH) 0.4 - 2.0 mmol/L   MAGNESIUM    Collection Time: 05/15/21  7:00 AM   Result Value Ref Range    Magnesium 2.3 1.6 - 2.4 mg/dL   GLUCOSE, POC    Collection Time: 05/15/21  7:10 AM   Result Value Ref Range    Glucose (POC) 594 (H) 65 - 117 mg/dL    Performed by [facility]    VENOUS BLOOD GAS    Collection Time: 05/15/21  7:30 AM   Result Value Ref Range    VENOUS PH 7.23 (L) 7.31 - 7.41      VENOUS PCO2 57.6 (H) 40 - 50 mmHg    VENOUS PO2 41 36 - 42 mmHg    VENOUS O2 SATURATION 78 60 - 80 %    VENOUS BICARBONATE 21 (L) 22 - 26 mmol/L    VENOUS BASE DEFICIT 3.2 (H) 0 - 2 mmol/L    O2 FLOW RATE 15 L/min    FIO2 100.0 %    EPAP/CPAP/PEEP 0      SITE Right Radial     GLUCOSE, POC    Collection Time: 05/15/21 12:07 PM   Result Value Ref Range    Glucose (POC) 218 (H) 65 - 117 mg/dL    Performed by [facility]         CT HEAD WO CONT   Final Result   Sphenoid sinus disease. No acute intracranial process. XR CHEST SNGL V   Final Result   Gas present in the upper abdomen, probably within dilated stomach. The lungs are subjectively hypoinflated. Mild diffuse groundglass pulmonary   opacities are noted, improved from previous. No focal airspace consolidation,   pleural effusion, or pneumothorax. Cardiopericardial silhouette is borderline   enlarged.       CT CHEST WO CONT    (Results Pending)        Vitals:    05/15/21 0800 05/15/21 0900 05/15/21 1000 05/15/21 1100   BP: 114/76 123/83 121/82 120/77   Pulse: 98 98 (!) 102 (!) 106   Resp: 16 17 19 16   Temp: 97.2 °F (36.2 °C) 97.2 °F (36.2 °C) 97.2 °F (36.2 °C) 97.3 °F (36.3 °C)   SpO2: 100% 100% 96% 100%   Weight:       Height:            Physical Exam:  Physical Exam  Constitutional:       General: She is not in acute distress. Appearance: She is not diaphoretic. HENT:      Head: Normocephalic and atraumatic. Neck:      Musculoskeletal: No neck rigidity or muscular tenderness. Cardiovascular:      Rate and Rhythm: Regular rhythm. Tachycardia present. Heart sounds: Normal heart sounds. Pulmonary:      Effort: Pulmonary effort is normal. No respiratory distress. Breath sounds: Normal breath sounds. No stridor. No wheezing. Chest:      Chest wall: Tenderness present. Abdominal:      General: Abdomen is flat. There is no distension. Palpations: Abdomen is soft. Tenderness: There is no guarding. Musculoskeletal: Normal range of motion. Skin:     General: Skin is warm and dry. Coloration: Skin is not pale. Neurological:      General: No focal deficit present. Mental Status: She is alert and oriented to person, place, and time. Psychiatric:      Comments: Patient is anxious about being in the hospital            Assessment:     Hospital Problems  Never Reviewed          Codes Class Noted POA    Syncope and collapse ICD-10-CM: R55  ICD-9-CM: 780.2  5/15/2021 Unknown          Impression:     1. Syncope   2. Hyperosmolar hyperglycemic syndrome  3. Lactic Acidosis  4. Hyperkalemia  5. Uncontrolled diabetes mellitus, type II  6. Rib pain status post CPR  7. Bilateral posterior pneumonia consistent with aspiration  8. CARMEN      Plan:   1. Syncope   EKG  Troponin <.05  Chest tenderness  CXR to evaluate for rib fracture  Chest CT to further evaluate for lung contusion/aspiration pneumonia and/or rib fracture  CT head negative acute process, sinusitis sphenoid  Consider echocardiogram  Most likely from dehydration, alcohol abuse and hyperglycemia    2. Hyperosmolar hyperglycemic syndrome  Glucose 639   IV Insulin will be discontinued for regular insulin.   Patient normally is on the IV insulin pump 75 units/day of Humalog  isotonic saline    3. Lactic Acidosis   Lactic Acid 7.7-->2.2  3 L IV fluids given with improvement lactic acidosis    4. Hyperkalemia  Potassium 5.2  Will improve with correction of the acidosis. 5.  Uncontrolled diabetes mellitus  Restart insulin, hold off on IV insulin pump    6. Rib pain status post CPR  CT of the chest to further evaluate for rib fractures    7. High suspicion for aspiration pneumonia  Patient with penicillin allergy  CT of the chest for further evaluation    8.   CARMEN  Continue with aggressive hydration  Monitor creatinine appropriately    CODE STATUS: Full code    DVT prophylaxis: Lovenox    Ulcer prophylaxis: Pepcid    Time of evaluation 70 min    Signed By: Mallory Perera     May 15, 2021

## 2021-05-15 NOTE — ED TRIAGE NOTES
EMS was called out for a unresponsive pt on a camp ground on floor with CPR being performed by significant other,who stated that pt had just mixed a drink and stated that she was not feeling well then passed out; jaw thrust performed by EMS and pt's airway was open, EMS started bagging her BS>600; pt was given narcan twice and did become more alert; upon arrival pt was alert stating that she was cold

## 2021-05-15 NOTE — ROUTINE PROCESS
TRANSFER - OUT REPORT: 
 
Verbal report given to RN on Si Layton  being transferred to [address] [place] [address]   for routine progression of care Report consisted of patients Situation, Background, Assessment and  
Recommendations(SBAR). Information from the following report(s) SBAR, ED Summary, Intake/Output, MAR and Recent Results was reviewed with the receiving nurse. Lines:  
Peripheral IV 05/15/21 Left Antecubital (Active) Peripheral IV 05/15/21 Posterior;Right Hand (Active) Opportunity for questions and clarification was provided. Patient transported with: 
 Monitor .  LPN

## 2021-05-15 NOTE — ED PROVIDER NOTES
EMERGENCY DEPARTMENT HISTORY AND PHYSICAL EXAM        Date: 5/15/2021  Patient Name: Silvia Gresham    History of Presenting Illness     Chief Complaint   Patient presents with    Altered mental status    High Blood Sugar     History Provided By: Patient    HPI: Silvia Gresham, 41 y.o. female with past medical history of diabetes, migraine headaches, and rheumatoid arthritis who presents with syncopal episode and altered mental status. Patient woke up this morning when she told her  she did not feel well. She lost consciousness. She does not remember what happened after that. There was bystander CPR. EMS arrived and patient was given Narcan which seemed to help with her mental status. She was not pulseless when EMS arrived. Patient currently has no complaints other than fatigue. No associated symptoms. PCP: Alvaro Schwab MD    Current Facility-Administered Medications   Medication Dose Route Frequency Provider Last Rate Last Admin    sodium chloride 0.9 % bolus infusion 1,000 mL  1,000 mL IntraVENous ONCE Chung Grover DO        insulin regular (NOVOLIN R, HUMULIN R) injection 10 Units  10 Units IntraVENous ONCE Chung Grover DO        insulin regular (NOVOLIN R, HUMULIN R) 100 Units in 0.9% sodium chloride 100 mL infusion  1-50 Units/hr IntraVENous TITRATE Chung Grover DO        dextrose (D50W) injection syrg 12.5 g  25 mL IntraVENous PRN Chung Cote DO        sodium chloride 0.9 % bolus infusion 1,000 mL  1,000 mL IntraVENous ONCE Chung Grover,         piperacillin-tazobactam (ZOSYN) 3.375 g in 0.9% sodium chloride (MBP/ADV) 100 mL MBP  3.375 g IntraVENous NOW Kristen DO Lesia         Current Outpatient Medications   Medication Sig Dispense Refill    DULoxetine (CYMBALTA) 60 mg capsule Take 60 mg by mouth daily.  etanercept (ENBREL) 50 mg/mL (0.98 mL) injection 50 mg by SubCUTAneous route.       fexofenadine (ALLEGRA) 60 mg tablet Take 60 mg by mouth two (2) times a day.  diclofenac (VOLTAREN) 1 % gel Apply 2 g to affected area four (4) times daily.  leucovorin calcium (WELLCOVORIN) 5 mg tablet Take 10 mg by mouth daily as needed (Take for 2 days after Methotrexate).  methotrexate, PF, 25 mg/mL injection every seven (7) days. Takes 8 units/ weekly      sulindac (CLINORIL) 200 mg tablet Take 400 mg by mouth two (2) times a day.  fluconazole (DIFLUCAN) 150 mg tablet Take 150 mg by mouth daily.  simvastatin (ZOCOR) 20 mg tablet Take 20 mg by mouth nightly.  cyclobenzaprine (FLEXERIL) 10 mg tablet Take 10 mg by mouth nightly.  insulin aspart (NOVOLOG) 100 unit/mL injection by SubCUTAneous route every twenty-four (24) hours. As per pump protocol, roughly 75units daily      pantoprazole (PROTONIX) 40 mg tablet Take 40 mg by mouth daily.  diltiazem hcl 180 mg Tb24 Take 1 Cap by mouth daily.  gabapentin (NEURONTIN) 300 mg capsule Take 300 mg by mouth nightly.  oxyCODONE-acetaminophen (PERCOCET) 5-325 mg per tablet Take 1 Tab by mouth every four (4) hours as needed for Pain. Max Daily Amount: 6 Tabs. 20 Tab 0       Past History     Past Medical History:  Past Medical History:   Diagnosis Date    Diabetes [facility] [address]     Migraine     RA (rheumatoid arthritis) ([facility] [address]        Past Surgical History:  Past Surgical History:   Procedure Laterality Date    HX GYN      Hysterectomy 2010       Family History:  Family History   Problem Relation Age of Onset    Breast Cancer Mother 58    Breast Cancer Maternal Aunt 50       Social History:  Social History     Tobacco Use    Smoking status: Never Smoker   Substance Use Topics    Alcohol use: Yes     Comment: rarely    Drug use: Not on file       Allergies:   Allergies   Allergen Reactions    Latex Hives    Amoxicillin Unknown (comments)    Aspirin Nausea Only    Clindamycin Rash    Iodine Hives    Pcn [Penicillins] Rash    Penicillin V Potassium Other (comments)    Shellfish Derived Hives    Sulfa (Sulfonamide Antibiotics) Unknown (comments)           Review of Systems   Review of Systems   Constitutional: Positive for fatigue. Negative for fever. HENT: Negative for congestion. Eyes: Negative for visual disturbance. Respiratory: Negative for shortness of breath. Cardiovascular: Negative for chest pain. Gastrointestinal: Negative for abdominal pain. Genitourinary: Negative for dysuria. Musculoskeletal: Negative for arthralgias. Skin: Negative for rash. Neurological: Positive for syncope. Negative for headaches. Physical Exam   Constitutional: No acute distress. Well-nourished. Skin: No rash. ENT: No rhinorrhea. No cough. Head is normocephalic and atraumatic. Eye: No proptosis or conjunctival injections. Respiratory: No apparent respiratory distress. Gastrointestinal: Nondistended. No abdominal tenderness. Musculoskeletal: No obvious bony deformities. Neuro: Slow to answer questions. No focal deficits. Cranial nerves II through XII grossly intact. Diagnostic Study Results     Labs -     Recent Results (from the past 24 hour(s))   CBC WITH AUTOMATED DIFF    Collection Time: 05/15/21  7:00 AM   Result Value Ref Range    WBC 10.2 3.6 - 11.0 K/uL    RBC 5.28 (H) 3.80 - 5.20 M/uL    HGB 15.2 11.5 - 16.0 g/dL    HCT 49.4 (H) 35.0 - 47.0 %    MCV 93.6 80.0 - 99.0 FL    MCH 28.8 26.0 - 34.0 PG    MCHC 30.8 30.0 - 36.5 g/dL    RDW 14.0 11.5 - 14.5 %    PLATELET 078 304 - 329 K/uL    MPV 11.7 8.9 - 12.9 FL    NRBC 0.0 0.0  WBC    ABSOLUTE NRBC 0.00 0.00 - 0.01 K/uL    NEUTROPHILS 49 32 - 75 %    LYMPHOCYTES 44 12 - 49 %    MONOCYTES 4 (L) 5 - 13 %    EOSINOPHILS 1 0 - 7 %    BASOPHILS 1 0 - 1 %    IMMATURE GRANULOCYTES 1 (H) 0 - 0.5 %    ABS. NEUTROPHILS 5.0 1.8 - 8.0 K/UL    ABS. LYMPHOCYTES 4.4 (H) 0.8 - 3.5 K/UL    ABS. MONOCYTES 0.4 0.0 - 1.0 K/UL    ABS. EOSINOPHILS 0.1 0.0 - 0.4 K/UL    ABS. BASOPHILS 0.1 0.0 - 0.1 K/UL    ABS. IMM. GRANS. 0.1 (H) 0.00 - 0.04 K/UL    DF AUTOMATED     METABOLIC PANEL, COMPREHENSIVE    Collection Time: 05/15/21  7:00 AM   Result Value Ref Range    Sodium 132 (L) 136 - 145 mmol/L    Potassium 5.2 (H) 3.5 - 5.1 mmol/L    Chloride 98 97 - 108 mmol/L    CO2 24 21 - 32 mmol/L    Anion gap 10 5 - 15 mmol/L    Glucose 639 (HH) 65 - 100 mg/dL    BUN 10 6 - 20 mg/dL    Creatinine 1.27 (H) 0.55 - 1.02 mg/dL    BUN/Creatinine ratio 8 (L) 12 - 20      GFR est AA 55 (L) >60 ml/min/1.73m2    GFR est non-AA 46 (L) >60 ml/min/1.73m2    Calcium 9.6 8.5 - 10.1 mg/dL    Bilirubin, total 0.5 0.2 - 1.0 mg/dL    AST (SGOT) 176 (H) 15 - 37 U/L    ALT (SGPT) 108 (H) 12 - 78 U/L    Alk.  phosphatase 149 (H) 45 - 117 U/L    Protein, total 8.2 6.4 - 8.2 g/dL    Albumin 3.7 3.5 - 5.0 g/dL    Globulin 4.5 (H) 2.0 - 4.0 g/dL    A-G Ratio 0.8 (L) 1.1 - 2.2     URINALYSIS W/ REFLEX CULTURE    Collection Time: 05/15/21  7:00 AM    Specimen: Urine   Result Value Ref Range    Color Yellow/Straw      Appearance Clear Clear      Specific gravity 1.025 1.003 - 1.030      pH (UA) 7.0 5.0 - 8.0      Protein Negative Negative mg/dL    Glucose >300 (A) Negative mg/dL    Ketone 5 (A) Negative mg/dL    Bilirubin Negative Negative      Blood Small (A) Negative      Urobilinogen 0.1 0.1 - 1.0 EU/dL    Nitrites Negative Negative      Leukocyte Esterase Negative Negative      UA:UC IF INDICATED Culture not indicated by UA result Culture not indicated by UA result      WBC 5-10 0 - 4 /hpf    RBC 10-20 0 - 5 /hpf    Bacteria Negative Negative /hpf   DRUG SCREEN, URINE    Collection Time: 05/15/21  7:00 AM   Result Value Ref Range    AMPHETAMINES Negative Negative      BARBITURATES Negative Negative      BENZODIAZEPINES Negative Negative      COCAINE Negative Negative      METHADONE Negative Negative      OPIATES Negative Negative      PCP(PHENCYCLIDINE) Negative Negative      THC (TH-CANNABINOL) Positive (A) Negative      Drug screen comment        This test is a screen for drugs of abuse in a medical setting only (i.e., they are unconfirmed results and as such must not be used for non-medical purposes, e.g.,employment testing, legal testing). Due to its inherent nature, false positive (FP) and false negative (FN) results may be obtained. Therefore, if necessary for medical care, recommend confirmation of positive findings by GC/MS. TROPONIN I    Collection Time: 05/15/21  7:00 AM   Result Value Ref Range    Troponin-I, Qt. <0.05 <0.05 ng/mL   ETHYL ALCOHOL    Collection Time: 05/15/21  7:00 AM   Result Value Ref Range    ALCOHOL(ETHYL),SERUM <4 <10 mg/dL   LACTIC ACID    Collection Time: 05/15/21  7:00 AM   Result Value Ref Range    Lactic acid 7.7 (HH) 0.4 - 2.0 mmol/L   MAGNESIUM    Collection Time: 05/15/21  7:00 AM   Result Value Ref Range    Magnesium 2.3 1.6 - 2.4 mg/dL   GLUCOSE, POC    Collection Time: 05/15/21  7:10 AM   Result Value Ref Range    Glucose (POC) 594 (H) 65 - 117 mg/dL    Performed by Hermilo Vargas    VENOUS BLOOD GAS    Collection Time: 05/15/21  7:30 AM   Result Value Ref Range    VENOUS PH 7.23 (L) 7.31 - 7.41      VENOUS PCO2 57.6 (H) 40 - 50 mmHg    VENOUS PO2 41 36 - 42 mmHg    VENOUS O2 SATURATION 78 60 - 80 %    VENOUS BICARBONATE 21 (L) 22 - 26 mmol/L    VENOUS BASE DEFICIT 3.2 (H) 0 - 2 mmol/L    O2 FLOW RATE 15 L/min    FIO2 100.0 %    EPAP/CPAP/PEEP 0      SITE Right Radial         Radiologic Studies -   CT HEAD WO CONT   Final Result   Sphenoid sinus disease. No acute intracranial process. XR CHEST SNGL V   Final Result   Gas present in the upper abdomen, probably within dilated stomach. The lungs are subjectively hypoinflated. Mild diffuse groundglass pulmonary   opacities are noted, improved from previous. No focal airspace consolidation,   pleural effusion, or pneumothorax. Cardiopericardial silhouette is borderline   enlarged.         CT Results  (Last 48 hours)               05/15/21 0900  CT HEAD WO CONT Final result Impression:  Sphenoid sinus disease. No acute intracranial process. Narrative:  Dose Reduction:    All CT scans at this facility are performed using dose reduction optimization   techniques as appropriate to a performed exam including the following: Automated   exposure control, adjustments of the mA and/or kV according to patient size, or   use of iterative reconstruction technique. Findings: Unenhanced images were obtained. The ventricles are of normal size,   shape, and position. No calvarial fracture is seen. No intracranial mass or   shift of midline structures. No evidence of acute large vessel distribution   infarction or intracranial hemorrhage. No abnormal extra-axial fluid collection   is seen. Hyperostosis frontalis interna. Bilateral fluid levels or posterior   mucosal thickening of sphenoid sinuses. CXR Results  (Last 48 hours)               05/15/21 0716  XR CHEST SNGL V Final result    Impression:  Gas present in the upper abdomen, probably within dilated stomach. The lungs are subjectively hypoinflated. Mild diffuse groundglass pulmonary   opacities are noted, improved from previous. No focal airspace consolidation,   pleural effusion, or pneumothorax. Cardiopericardial silhouette is borderline   enlarged. Narrative:  Portable chest, 0712 hours, compared with 1/1/2021. Medical Decision Making and ED Course     I reviewed the available vital signs, nursing notes, past medical history, past surgical history, family history, and social history. Vital Signs - Reviewed the patient's vital signs. Patient Vitals for the past 12 hrs:   Temp Pulse Resp BP SpO2   05/15/21 0654 (!) 96.4 °F (35.8 °C) (!) 109 23 117/81 92 %       EKG interpretation: Obtained on 5/15/2021 at 0654. Read at [phone]. Sinus tachycardia at rate of 108 bpm.  Normal DC interval, QRS duration, QTc interval.  No ST segment abnormalities. Normal axis.     Medical Decision Making: Presented with syncopal episode. The differential diagnosis is syncope, seizure, cardiac arrest, arrhythmia, glycemia, DKA, HHS. Patient is hypothermic and tachycardic. She is well-appearing however slow to respond to questioning. CT head unremarkable. Work-up shows hyperglycemia with decreased pH. She did very likely have DKA. Will start insulin drip. She also could have infection with lactic acid of 7.7 and hypothermia. I did order Zosyn empirically. Unclear cause of infection if present. Was given 2 L normal saline as well. Admitted to hospitalist for further care and evaluation. Procedures     Critical Care Note:   8:11 AM  Amount of critical care time: 60 minutes  Impending deterioration: CNS and Metabolic  Associated risk factors: CNS Decompensation  Management: Bedside Assessment and Supervision of Care  Interpretation: Xrays, CT Scan and ECG  Interventions: IV fluids, insulin infusion  Case review: hospitalist  Treatment response: guarded  Performed by: Self  Notes: I have spent critical care time involved in lab review, decision making, bedside attention, and documentation. This time excludes time spent in any separate billed procedures. During this entire length of time I was immediately available to the patient. Breann Gallegos,     Disposition     Admitted to Hospitalist    Diagnosis     Clinical impression:   1. Syncope, unspecified syncope type    2. Sepsis, due to unspecified organism, unspecified whether acute organ dysfunction present ([address]    3. Hyperglycemia           Attestation:  Please note that this dictation was completed with [employer], the computer voice recognition software. Quite often unanticipated grammatical, syntax, homophones, and other interpretive errors are inadvertently transcribed by the computer software. Please disregard these errors. Please excuse any errors that have escaped final proofreading. Thank you.   Breann Gallegos, DO

## 2021-05-16 ENCOUNTER — APPOINTMENT (OUTPATIENT)
Dept: NON INVASIVE DIAGNOSTICS | Age: 46
DRG: 637 | End: 2021-05-16
Attending: PHYSICIAN ASSISTANT
Payer: COMMERCIAL

## 2021-05-16 VITALS
RESPIRATION RATE: 18 BRPM | HEART RATE: 121 BPM | TEMPERATURE: 99.4 F | OXYGEN SATURATION: 98 % | SYSTOLIC BLOOD PRESSURE: 103 MMHG | HEIGHT: 66 IN | WEIGHT: 217 LBS | BODY MASS INDEX: 34.87 KG/M2 | DIASTOLIC BLOOD PRESSURE: 55 MMHG

## 2021-05-16 LAB
ALBUMIN SERPL-MCNC: 3.1 G/DL (ref 3.5–5)
ALBUMIN/GLOB SERPL: 0.7 {RATIO} (ref 1.1–2.2)
ALP SERPL-CCNC: 114 U/L (ref 45–117)
ALT SERPL-CCNC: 62 U/L (ref 12–78)
ANION GAP SERPL CALC-SCNC: 8 MMOL/L (ref 5–15)
AST SERPL W P-5'-P-CCNC: 43 U/L (ref 15–37)
ATRIAL RATE: 108 BPM
BASOPHILS # BLD: 0.1 K/UL (ref 0–0.1)
BASOPHILS NFR BLD: 0 % (ref 0–1)
BILIRUB SERPL-MCNC: 0.7 MG/DL (ref 0.2–1)
BUN SERPL-MCNC: 6 MG/DL (ref 6–20)
BUN/CREAT SERPL: 9 (ref 12–20)
CA-I BLD-MCNC: 9.1 MG/DL (ref 8.5–10.1)
CALCULATED P AXIS, ECG09: 26 DEGREES
CALCULATED R AXIS, ECG10: 12 DEGREES
CALCULATED T AXIS, ECG11: 74 DEGREES
CHLORIDE SERPL-SCNC: 106 MMOL/L (ref 97–108)
CO2 SERPL-SCNC: 23 MMOL/L (ref 21–32)
CREAT SERPL-MCNC: 0.67 MG/DL (ref 0.55–1.02)
DIAGNOSIS, 93000: NORMAL
DIFFERENTIAL METHOD BLD: ABNORMAL
EOSINOPHIL # BLD: 0 K/UL (ref 0–0.4)
EOSINOPHIL NFR BLD: 0 % (ref 0–7)
ERYTHROCYTE [DISTWIDTH] IN BLOOD BY AUTOMATED COUNT: 14.3 % (ref 11.5–14.5)
EST. AVERAGE GLUCOSE BLD GHB EST-MCNC: 206 MG/DL
GLOBULIN SER CALC-MCNC: 4.3 G/DL (ref 2–4)
GLUCOSE BLD STRIP.AUTO-MCNC: 291 MG/DL (ref 65–117)
GLUCOSE SERPL-MCNC: 197 MG/DL (ref 65–100)
HBA1C MFR BLD: 8.8 % (ref 4–5.6)
HCT VFR BLD AUTO: 47.5 % (ref 35–47)
HGB BLD-MCNC: 14.6 G/DL (ref 11.5–16)
IMM GRANULOCYTES # BLD AUTO: 0.1 K/UL (ref 0–0.04)
IMM GRANULOCYTES NFR BLD AUTO: 1 % (ref 0–0.5)
LYMPHOCYTES # BLD: 1.5 K/UL (ref 0.8–3.5)
LYMPHOCYTES NFR BLD: 8 % (ref 12–49)
MCH RBC QN AUTO: 28.7 PG (ref 26–34)
MCHC RBC AUTO-ENTMCNC: 30.7 G/DL (ref 30–36.5)
MCV RBC AUTO: 93.3 FL (ref 80–99)
MONOCYTES # BLD: 0.5 K/UL (ref 0–1)
MONOCYTES NFR BLD: 3 % (ref 5–13)
NEUTS SEG # BLD: 15.9 K/UL (ref 1.8–8)
NEUTS SEG NFR BLD: 88 % (ref 32–75)
NRBC # BLD: 0 K/UL (ref 0–0.01)
NRBC BLD-RTO: 0 PER 100 WBC
P-R INTERVAL, ECG05: 128 MS
PERFORMED BY, TECHID: ABNORMAL
PLATELET # BLD AUTO: 182 K/UL (ref 150–400)
PMV BLD AUTO: 12.2 FL (ref 8.9–12.9)
POTASSIUM SERPL-SCNC: 3.9 MMOL/L (ref 3.5–5.1)
PROT SERPL-MCNC: 7.4 G/DL (ref 6.4–8.2)
Q-T INTERVAL, ECG07: 360 MS
QRS DURATION, ECG06: 70 MS
QTC CALCULATION (BEZET), ECG08: 482 MS
RBC # BLD AUTO: 5.09 M/UL (ref 3.8–5.2)
SODIUM SERPL-SCNC: 137 MMOL/L (ref 136–145)
VENTRICULAR RATE, ECG03: 108 BPM
WBC # BLD AUTO: 18.1 K/UL (ref 3.6–11)

## 2021-05-16 PROCEDURE — 74011250637 HC RX REV CODE- 250/637: Performed by: INTERNAL MEDICINE

## 2021-05-16 PROCEDURE — 82962 GLUCOSE BLOOD TEST: CPT

## 2021-05-16 PROCEDURE — 74011250637 HC RX REV CODE- 250/637: Performed by: PHYSICIAN ASSISTANT

## 2021-05-16 PROCEDURE — 80053 COMPREHEN METABOLIC PANEL: CPT

## 2021-05-16 PROCEDURE — 36415 COLL VENOUS BLD VENIPUNCTURE: CPT

## 2021-05-16 PROCEDURE — 74011636637 HC RX REV CODE- 636/637: Performed by: PHYSICIAN ASSISTANT

## 2021-05-16 PROCEDURE — 74011250636 HC RX REV CODE- 250/636: Performed by: PHYSICIAN ASSISTANT

## 2021-05-16 PROCEDURE — 85025 COMPLETE CBC W/AUTO DIFF WBC: CPT

## 2021-05-16 RX ORDER — METRONIDAZOLE 500 MG/1
500 TABLET ORAL 3 TIMES DAILY
Qty: 21 TAB | Refills: 0 | Status: SHIPPED | OUTPATIENT
Start: 2021-05-16

## 2021-05-16 RX ORDER — LEVOFLOXACIN 750 MG/1
750 TABLET ORAL DAILY
Qty: 7 TAB | Refills: 0 | Status: SHIPPED | OUTPATIENT
Start: 2021-05-16

## 2021-05-16 RX ADMIN — METRONIDAZOLE 500 MG: 500 INJECTION, SOLUTION INTRAVENOUS at 04:38

## 2021-05-16 RX ADMIN — SUCRALFATE 1 G: 1 TABLET ORAL at 07:50

## 2021-05-16 RX ADMIN — INSULIN LISPRO 7 UNITS: 100 INJECTION, SOLUTION INTRAVENOUS; SUBCUTANEOUS at 07:51

## 2021-05-16 NOTE — PROGRESS NOTES
Patient leaving AMA. Tele box removed and martinez catheter removed. Reasoner spoke with patient at bedside. Patient educated about the importance of inpatient treatment. Patient refused to sign AMA paper. Paper placed in patient's chart. Patient had a friend bringing clothes for her. Patient walked downstairs with friend at [phone].

## 2021-05-16 NOTE — PROGRESS NOTES
Patient transport came to the unit to take the patient down to cardiovascular lab. Patient is refusing to go and states that she wants to go home.

## 2021-05-16 NOTE — PROGRESS NOTES
Problem: Falls - Risk of  Goal: *Absence of Falls  Description: Document Authur Senters Fall Risk and appropriate interventions in the flowsheet.   Outcome: Progressing Towards Goal  Note: Fall Risk Interventions:                 Elimination Interventions: Bed/chair exit alarm, Call light in reach

## 2021-05-16 NOTE — PROGRESS NOTES
AMA note: This a 50-year-old female admitted on May 15, 2021 with a history of diabetes mellitus, rheumatoid arthritis gastroesophageal reflux disease essential hypertension that was admitted after a syncopal episode and bystander felt the patient was not breathing and CPR was initiated. Patient presented to the emergency department and a profound lactic acidosis was observed with a lactic acid of greater than 7. She received greater than 3 L of IV fluids with improvement in her lactic acid down to 2.2. She also had severe chest pain and a CT of the chest revealed a cortical fracture of the right anterior seventh rib and left anterior sixth rib. These were nondisplaced. There was significant bilateral airspace disease present suspicious for aspiration pneumonia. Patient was started on Levaquin and Flagyl due to penicillin allergy. She remained in acute respiratory failure with hypoxia and required oxygen supplementation. She has remained tachycardic as well. She has refused further work-up to include echocardiogram and cardiac consultation. At this point the patient would like to leave [facility] and have prescribed Levaquin and Flagyl due to her worsening white count for the next 7 days. Have had extensive conversations with the patient in regards to her medical decision to leave AMA. Risks were discussed to include worsening pneumonia, the possibility of a fatal event as her syncopal episode is still unclear. Even with these risks discussed the patient still would like to leave [facility]. She is not ready for discharge although these prescriptions were provided to assist her. Have advised her to return to a medical facility for further care. She states that she has PTSD related to Covid admission at the [place] [id number] and does not want to be affiliated with this facility or hospital system. Have offered a transfer to another facility and she has declined.   At this point there is no rectifying the situation and patient will leave [address] Makenzie Martinez nurse present during this conversation.

## 2021-05-16 NOTE — PROGRESS NOTES
Patient removed IV from left [facility] herself. Patient requested IV in right hand to be removed, RN removed it. Reasoner made aware that patient wants to leave.

## 2021-05-22 LAB
BACTERIA SPEC CULT: NORMAL
SPECIAL REQUESTS,SREQ: NORMAL

## 2022-03-19 PROBLEM — U07.1 PNEUMONIA DUE TO COVID-19 VIRUS: Status: ACTIVE | Noted: 2021-01-01

## 2022-03-19 PROBLEM — J12.82 PNEUMONIA DUE TO COVID-19 VIRUS: Status: ACTIVE | Noted: 2021-01-01

## 2022-03-19 PROBLEM — R55 SYNCOPE AND COLLAPSE: Status: ACTIVE | Noted: 2021-05-15

## 2023-05-11 RX ORDER — SULINDAC 200 MG/1
TABLET ORAL 2 TIMES DAILY
COMMUNITY

## 2023-05-11 RX ORDER — FLUCONAZOLE 150 MG/1
150 TABLET ORAL DAILY
COMMUNITY

## 2023-05-11 RX ORDER — BUPROPION HYDROCHLORIDE 150 MG/1
TABLET ORAL
COMMUNITY

## 2023-05-11 RX ORDER — LEUCOVORIN CALCIUM 5 MG/1
TABLET ORAL DAILY PRN
COMMUNITY

## 2023-05-11 RX ORDER — SIMVASTATIN 20 MG
20 TABLET ORAL NIGHTLY
COMMUNITY

## 2023-05-11 RX ORDER — INSULIN ASPART 100 [IU]/ML
INJECTION, SOLUTION INTRAVENOUS; SUBCUTANEOUS EVERY 24 HOURS
COMMUNITY

## 2023-05-11 RX ORDER — FEXOFENADINE HYDROCHLORIDE 60 MG/1
TABLET, FILM COATED ORAL 2 TIMES DAILY
COMMUNITY

## 2023-05-11 RX ORDER — LEVOFLOXACIN 750 MG/1
TABLET ORAL DAILY
COMMUNITY
Start: 2021-05-16

## 2023-05-11 RX ORDER — OXYCODONE HYDROCHLORIDE AND ACETAMINOPHEN 5; 325 MG/1; MG/1
1 TABLET ORAL EVERY 4 HOURS PRN
COMMUNITY
Start: 2016-06-01

## 2023-05-11 RX ORDER — GABAPENTIN 300 MG/1
300 CAPSULE ORAL NIGHTLY
COMMUNITY

## 2023-05-11 RX ORDER — METRONIDAZOLE 500 MG/1
TABLET ORAL 3 TIMES DAILY
COMMUNITY
Start: 2021-05-16

## 2023-05-11 RX ORDER — DULOXETIN HYDROCHLORIDE 60 MG/1
60 CAPSULE, DELAYED RELEASE ORAL DAILY
COMMUNITY

## 2023-05-11 RX ORDER — PANTOPRAZOLE SODIUM 40 MG/1
40 TABLET, DELAYED RELEASE ORAL DAILY
COMMUNITY

## 2023-05-11 RX ORDER — CYCLOBENZAPRINE HCL 10 MG
10 TABLET ORAL NIGHTLY
COMMUNITY